# Patient Record
Sex: FEMALE | Race: WHITE | NOT HISPANIC OR LATINO | Employment: OTHER | URBAN - METROPOLITAN AREA
[De-identification: names, ages, dates, MRNs, and addresses within clinical notes are randomized per-mention and may not be internally consistent; named-entity substitution may affect disease eponyms.]

---

## 2021-11-02 ENCOUNTER — APPOINTMENT (OUTPATIENT)
Dept: RADIOLOGY | Facility: CLINIC | Age: 66
End: 2021-11-02
Payer: COMMERCIAL

## 2021-11-02 ENCOUNTER — TELEPHONE (OUTPATIENT)
Dept: OBGYN CLINIC | Facility: CLINIC | Age: 66
End: 2021-11-02

## 2021-11-02 ENCOUNTER — OFFICE VISIT (OUTPATIENT)
Dept: OBGYN CLINIC | Facility: CLINIC | Age: 66
End: 2021-11-02
Payer: COMMERCIAL

## 2021-11-02 VITALS
SYSTOLIC BLOOD PRESSURE: 124 MMHG | DIASTOLIC BLOOD PRESSURE: 72 MMHG | WEIGHT: 156 LBS | BODY MASS INDEX: 25.99 KG/M2 | HEART RATE: 76 BPM | HEIGHT: 65 IN

## 2021-11-02 DIAGNOSIS — M17.11 PRIMARY OSTEOARTHRITIS OF RIGHT KNEE: ICD-10-CM

## 2021-11-02 DIAGNOSIS — M25.561 RIGHT KNEE PAIN, UNSPECIFIED CHRONICITY: ICD-10-CM

## 2021-11-02 DIAGNOSIS — Z01.89 ENCOUNTER FOR LOWER EXTREMITY COMPARISON IMAGING STUDY: Primary | ICD-10-CM

## 2021-11-02 PROCEDURE — 73560 X-RAY EXAM OF KNEE 1 OR 2: CPT

## 2021-11-02 PROCEDURE — 20610 DRAIN/INJ JOINT/BURSA W/O US: CPT | Performed by: ORTHOPAEDIC SURGERY

## 2021-11-02 PROCEDURE — 99204 OFFICE O/P NEW MOD 45 MIN: CPT | Performed by: ORTHOPAEDIC SURGERY

## 2021-11-02 PROCEDURE — 73562 X-RAY EXAM OF KNEE 3: CPT

## 2021-11-02 RX ORDER — DEXAMETHASONE SODIUM PHOSPHATE 100 MG/10ML
40 INJECTION INTRAMUSCULAR; INTRAVENOUS
Status: COMPLETED | OUTPATIENT
Start: 2021-11-02 | End: 2021-11-02

## 2021-11-02 RX ORDER — LIDOCAINE HYDROCHLORIDE 10 MG/ML
4 INJECTION, SOLUTION INFILTRATION; PERINEURAL
Status: COMPLETED | OUTPATIENT
Start: 2021-11-02 | End: 2021-11-02

## 2021-11-02 RX ADMIN — DEXAMETHASONE SODIUM PHOSPHATE 40 MG: 100 INJECTION INTRAMUSCULAR; INTRAVENOUS at 09:11

## 2021-11-02 RX ADMIN — LIDOCAINE HYDROCHLORIDE 4 ML: 10 INJECTION, SOLUTION INFILTRATION; PERINEURAL at 09:11

## 2021-11-03 ENCOUNTER — EVALUATION (OUTPATIENT)
Dept: PHYSICAL THERAPY | Facility: CLINIC | Age: 66
End: 2021-11-03
Payer: COMMERCIAL

## 2021-11-03 DIAGNOSIS — M25.561 RIGHT KNEE PAIN, UNSPECIFIED CHRONICITY: ICD-10-CM

## 2021-11-03 DIAGNOSIS — M17.11 PRIMARY OSTEOARTHRITIS OF RIGHT KNEE: Primary | ICD-10-CM

## 2021-11-03 PROCEDURE — 97162 PT EVAL MOD COMPLEX 30 MIN: CPT

## 2021-11-10 ENCOUNTER — OFFICE VISIT (OUTPATIENT)
Dept: PHYSICAL THERAPY | Facility: CLINIC | Age: 66
End: 2021-11-10
Payer: COMMERCIAL

## 2021-11-10 DIAGNOSIS — M17.11 PRIMARY OSTEOARTHRITIS OF RIGHT KNEE: Primary | ICD-10-CM

## 2021-11-10 DIAGNOSIS — M25.561 RIGHT KNEE PAIN, UNSPECIFIED CHRONICITY: ICD-10-CM

## 2021-11-10 PROCEDURE — 97110 THERAPEUTIC EXERCISES: CPT

## 2021-11-10 PROCEDURE — 97140 MANUAL THERAPY 1/> REGIONS: CPT

## 2021-11-12 ENCOUNTER — OFFICE VISIT (OUTPATIENT)
Dept: PHYSICAL THERAPY | Facility: CLINIC | Age: 66
End: 2021-11-12
Payer: COMMERCIAL

## 2021-11-12 DIAGNOSIS — M25.561 RIGHT KNEE PAIN, UNSPECIFIED CHRONICITY: ICD-10-CM

## 2021-11-12 DIAGNOSIS — M17.11 PRIMARY OSTEOARTHRITIS OF RIGHT KNEE: Primary | ICD-10-CM

## 2021-11-12 PROCEDURE — 97112 NEUROMUSCULAR REEDUCATION: CPT

## 2021-11-12 PROCEDURE — 97110 THERAPEUTIC EXERCISES: CPT

## 2021-11-17 ENCOUNTER — OFFICE VISIT (OUTPATIENT)
Dept: PHYSICAL THERAPY | Facility: CLINIC | Age: 66
End: 2021-11-17
Payer: COMMERCIAL

## 2021-11-17 DIAGNOSIS — M17.11 PRIMARY OSTEOARTHRITIS OF RIGHT KNEE: Primary | ICD-10-CM

## 2021-11-17 DIAGNOSIS — M25.561 RIGHT KNEE PAIN, UNSPECIFIED CHRONICITY: ICD-10-CM

## 2021-11-17 PROCEDURE — 97112 NEUROMUSCULAR REEDUCATION: CPT

## 2021-11-17 PROCEDURE — 97110 THERAPEUTIC EXERCISES: CPT

## 2021-11-19 ENCOUNTER — OFFICE VISIT (OUTPATIENT)
Dept: PHYSICAL THERAPY | Facility: CLINIC | Age: 66
End: 2021-11-19
Payer: COMMERCIAL

## 2021-11-19 DIAGNOSIS — M25.561 RIGHT KNEE PAIN, UNSPECIFIED CHRONICITY: ICD-10-CM

## 2021-11-19 DIAGNOSIS — M17.11 PRIMARY OSTEOARTHRITIS OF RIGHT KNEE: Primary | ICD-10-CM

## 2021-11-19 PROCEDURE — 97112 NEUROMUSCULAR REEDUCATION: CPT

## 2021-11-19 PROCEDURE — 97110 THERAPEUTIC EXERCISES: CPT

## 2021-11-22 ENCOUNTER — OFFICE VISIT (OUTPATIENT)
Dept: PHYSICAL THERAPY | Facility: CLINIC | Age: 66
End: 2021-11-22
Payer: COMMERCIAL

## 2021-11-22 DIAGNOSIS — M17.11 PRIMARY OSTEOARTHRITIS OF RIGHT KNEE: Primary | ICD-10-CM

## 2021-11-22 DIAGNOSIS — M25.561 RIGHT KNEE PAIN, UNSPECIFIED CHRONICITY: ICD-10-CM

## 2021-11-22 PROCEDURE — 97110 THERAPEUTIC EXERCISES: CPT

## 2021-11-22 PROCEDURE — 97112 NEUROMUSCULAR REEDUCATION: CPT

## 2021-11-24 ENCOUNTER — OFFICE VISIT (OUTPATIENT)
Dept: PHYSICAL THERAPY | Facility: CLINIC | Age: 66
End: 2021-11-24
Payer: COMMERCIAL

## 2021-11-24 DIAGNOSIS — M25.561 RIGHT KNEE PAIN, UNSPECIFIED CHRONICITY: ICD-10-CM

## 2021-11-24 DIAGNOSIS — M17.11 PRIMARY OSTEOARTHRITIS OF RIGHT KNEE: Primary | ICD-10-CM

## 2021-11-24 PROCEDURE — 97112 NEUROMUSCULAR REEDUCATION: CPT

## 2021-11-24 PROCEDURE — 97140 MANUAL THERAPY 1/> REGIONS: CPT

## 2021-11-29 ENCOUNTER — OFFICE VISIT (OUTPATIENT)
Dept: PHYSICAL THERAPY | Facility: CLINIC | Age: 66
End: 2021-11-29
Payer: COMMERCIAL

## 2021-11-29 DIAGNOSIS — M17.11 PRIMARY OSTEOARTHRITIS OF RIGHT KNEE: Primary | ICD-10-CM

## 2021-11-29 DIAGNOSIS — M25.561 RIGHT KNEE PAIN, UNSPECIFIED CHRONICITY: ICD-10-CM

## 2021-11-29 PROCEDURE — 97112 NEUROMUSCULAR REEDUCATION: CPT

## 2021-11-29 PROCEDURE — 97110 THERAPEUTIC EXERCISES: CPT

## 2021-12-01 ENCOUNTER — OFFICE VISIT (OUTPATIENT)
Dept: PHYSICAL THERAPY | Facility: CLINIC | Age: 66
End: 2021-12-01
Payer: COMMERCIAL

## 2021-12-01 DIAGNOSIS — M25.561 RIGHT KNEE PAIN, UNSPECIFIED CHRONICITY: ICD-10-CM

## 2021-12-01 DIAGNOSIS — M17.11 PRIMARY OSTEOARTHRITIS OF RIGHT KNEE: Primary | ICD-10-CM

## 2021-12-01 PROCEDURE — 97110 THERAPEUTIC EXERCISES: CPT

## 2021-12-01 PROCEDURE — 97140 MANUAL THERAPY 1/> REGIONS: CPT

## 2021-12-06 ENCOUNTER — EVALUATION (OUTPATIENT)
Dept: PHYSICAL THERAPY | Facility: CLINIC | Age: 66
End: 2021-12-06
Payer: COMMERCIAL

## 2021-12-06 DIAGNOSIS — M25.561 RIGHT KNEE PAIN, UNSPECIFIED CHRONICITY: ICD-10-CM

## 2021-12-06 DIAGNOSIS — M17.11 PRIMARY OSTEOARTHRITIS OF RIGHT KNEE: Primary | ICD-10-CM

## 2021-12-06 PROCEDURE — 97110 THERAPEUTIC EXERCISES: CPT

## 2021-12-06 PROCEDURE — 97140 MANUAL THERAPY 1/> REGIONS: CPT

## 2021-12-08 ENCOUNTER — OFFICE VISIT (OUTPATIENT)
Dept: PHYSICAL THERAPY | Facility: CLINIC | Age: 66
End: 2021-12-08
Payer: COMMERCIAL

## 2021-12-08 DIAGNOSIS — M17.11 PRIMARY OSTEOARTHRITIS OF RIGHT KNEE: Primary | ICD-10-CM

## 2021-12-08 DIAGNOSIS — M25.561 RIGHT KNEE PAIN, UNSPECIFIED CHRONICITY: ICD-10-CM

## 2021-12-08 PROCEDURE — 97112 NEUROMUSCULAR REEDUCATION: CPT

## 2021-12-08 PROCEDURE — 97110 THERAPEUTIC EXERCISES: CPT

## 2021-12-13 ENCOUNTER — APPOINTMENT (OUTPATIENT)
Dept: PHYSICAL THERAPY | Facility: CLINIC | Age: 66
End: 2021-12-13
Payer: COMMERCIAL

## 2021-12-13 ENCOUNTER — OFFICE VISIT (OUTPATIENT)
Dept: OBGYN CLINIC | Facility: CLINIC | Age: 66
End: 2021-12-13
Payer: COMMERCIAL

## 2021-12-13 VITALS
HEART RATE: 82 BPM | SYSTOLIC BLOOD PRESSURE: 144 MMHG | BODY MASS INDEX: 25.99 KG/M2 | DIASTOLIC BLOOD PRESSURE: 81 MMHG | WEIGHT: 156 LBS | HEIGHT: 65 IN

## 2021-12-13 DIAGNOSIS — M17.11 PRIMARY OSTEOARTHRITIS OF RIGHT KNEE: Primary | ICD-10-CM

## 2021-12-13 PROCEDURE — 99214 OFFICE O/P EST MOD 30 MIN: CPT | Performed by: PHYSICIAN ASSISTANT

## 2021-12-14 ENCOUNTER — OFFICE VISIT (OUTPATIENT)
Dept: PHYSICAL THERAPY | Facility: CLINIC | Age: 66
End: 2021-12-14
Payer: COMMERCIAL

## 2021-12-14 DIAGNOSIS — M17.11 PRIMARY OSTEOARTHRITIS OF RIGHT KNEE: Primary | ICD-10-CM

## 2021-12-14 DIAGNOSIS — M25.561 RIGHT KNEE PAIN, UNSPECIFIED CHRONICITY: ICD-10-CM

## 2021-12-14 PROCEDURE — 97110 THERAPEUTIC EXERCISES: CPT

## 2021-12-15 ENCOUNTER — APPOINTMENT (OUTPATIENT)
Dept: PHYSICAL THERAPY | Facility: CLINIC | Age: 66
End: 2021-12-15
Payer: COMMERCIAL

## 2021-12-17 ENCOUNTER — OFFICE VISIT (OUTPATIENT)
Dept: PHYSICAL THERAPY | Facility: CLINIC | Age: 66
End: 2021-12-17
Payer: COMMERCIAL

## 2021-12-17 DIAGNOSIS — M17.11 PRIMARY OSTEOARTHRITIS OF RIGHT KNEE: Primary | ICD-10-CM

## 2021-12-17 DIAGNOSIS — M25.561 RIGHT KNEE PAIN, UNSPECIFIED CHRONICITY: ICD-10-CM

## 2021-12-17 PROCEDURE — 97530 THERAPEUTIC ACTIVITIES: CPT

## 2021-12-17 PROCEDURE — 97110 THERAPEUTIC EXERCISES: CPT

## 2021-12-20 ENCOUNTER — APPOINTMENT (OUTPATIENT)
Dept: PHYSICAL THERAPY | Facility: CLINIC | Age: 66
End: 2021-12-20
Payer: COMMERCIAL

## 2021-12-23 ENCOUNTER — APPOINTMENT (OUTPATIENT)
Dept: PHYSICAL THERAPY | Facility: CLINIC | Age: 66
End: 2021-12-23
Payer: COMMERCIAL

## 2021-12-27 ENCOUNTER — OFFICE VISIT (OUTPATIENT)
Dept: PHYSICAL THERAPY | Facility: CLINIC | Age: 66
End: 2021-12-27
Payer: COMMERCIAL

## 2021-12-27 DIAGNOSIS — M25.561 RIGHT KNEE PAIN, UNSPECIFIED CHRONICITY: ICD-10-CM

## 2021-12-27 DIAGNOSIS — M17.11 PRIMARY OSTEOARTHRITIS OF RIGHT KNEE: Primary | ICD-10-CM

## 2021-12-27 PROCEDURE — 97112 NEUROMUSCULAR REEDUCATION: CPT

## 2021-12-27 PROCEDURE — 97110 THERAPEUTIC EXERCISES: CPT

## 2022-03-15 ENCOUNTER — OFFICE VISIT (OUTPATIENT)
Dept: OBGYN CLINIC | Facility: CLINIC | Age: 67
End: 2022-03-15
Payer: MEDICARE

## 2022-03-15 VITALS
SYSTOLIC BLOOD PRESSURE: 136 MMHG | HEIGHT: 65 IN | WEIGHT: 156 LBS | HEART RATE: 81 BPM | DIASTOLIC BLOOD PRESSURE: 81 MMHG | BODY MASS INDEX: 25.99 KG/M2

## 2022-03-15 DIAGNOSIS — M23.91 INTERNAL DERANGEMENT OF RIGHT KNEE: ICD-10-CM

## 2022-03-15 DIAGNOSIS — M17.11 PRIMARY OSTEOARTHRITIS OF RIGHT KNEE: Primary | ICD-10-CM

## 2022-03-15 PROCEDURE — 99214 OFFICE O/P EST MOD 30 MIN: CPT | Performed by: ORTHOPAEDIC SURGERY

## 2022-03-15 NOTE — PROGRESS NOTES
Assessment/Plan:  1  Primary osteoarthritis of right knee       The patient does have some arthritis about the knee, however given her lack of improvement with conservative treatment and her new onset of mechanical symptoms, I do have concern for medial meniscus tear  I am ordering an MRI to rule this out  She will follow-up after the MRI to discuss the results and how to proceed  She may continue ice and OTC medications for discomfort in the meantime  Subjective:   Olivia León is a 74 y o  female who presents today for follow-up of her right knee  We have been treating her for mild to moderate arthritis about the medial and patellofemoral compartments  Prior steroid injection helped for only one day  She has been doing physical therapy, which seems to be helping some, but she still is quite bothered by her knee pain  Her pain is about the medial compartment and is worse with activity and better with rest  She notes swelling about the knee and has started with intermittent locking about the knee  Review of Systems   Constitutional: Negative  Negative for chills and fever  HENT: Negative  Negative for ear pain and sore throat  Eyes: Negative  Negative for pain and redness  Respiratory: Negative  Negative for shortness of breath and wheezing  Cardiovascular: Negative for chest pain and palpitations  Gastrointestinal: Negative  Negative for abdominal pain and blood in stool  Endocrine: Negative  Negative for polydipsia and polyuria  Genitourinary: Negative  Negative for difficulty urinating and dysuria  Musculoskeletal:        As noted in HPI   Skin: Negative  Negative for pallor and rash  Neurological: Negative  Negative for dizziness and numbness  Hematological: Negative  Negative for adenopathy  Does not bruise/bleed easily  Psychiatric/Behavioral: Negative  Negative for confusion and suicidal ideas  History reviewed  No pertinent past medical history      Past Surgical History:   Procedure Laterality Date    KNEE SURGERY Left     Meniscus Repair       Family History   Problem Relation Age of Onset    No Known Problems Mother     No Known Problems Father     No Known Problems Sister     No Known Problems Brother     No Known Problems Maternal Aunt     No Known Problems Maternal Uncle     No Known Problems Paternal Aunt     No Known Problems Paternal Uncle     No Known Problems Maternal Grandmother     No Known Problems Maternal Grandfather     No Known Problems Paternal Grandmother     No Known Problems Paternal Grandfather        Social History     Occupational History    Not on file   Tobacco Use    Smoking status: Former Smoker    Smokeless tobacco: Never Used   Vaping Use    Vaping Use: Never used   Substance and Sexual Activity    Alcohol use: Yes     Comment: Rare    Drug use: Never    Sexual activity: Not on file       No current outpatient medications on file  No Known Allergies    Objective:  Vitals:    03/15/22 0834   BP: 136/81   Pulse: 81       Right Knee Exam     Tenderness   The patient is experiencing tenderness in the medial joint line  Range of Motion   Extension: 0   Flexion: 110     Tests   Varus: negative Valgus: negative  Lachman:  Anterior - negative      Drawer:  Anterior - negative    Posterior - negative    Other   Erythema: absent  Sensation: normal  Pulse: present  Effusion: effusion (trace) present          Observations     Right Knee   Positive for effusion (trace)  Physical Exam  Constitutional:       General: She is not in acute distress  Appearance: She is well-developed  HENT:      Head: Normocephalic and atraumatic  Eyes:      General: No scleral icterus  Conjunctiva/sclera: Conjunctivae normal    Neck:      Vascular: No JVD  Cardiovascular:      Rate and Rhythm: Normal rate  Pulmonary:      Effort: Pulmonary effort is normal  No respiratory distress     Musculoskeletal:      Right knee: Effusion (trace) present  Skin:     General: Skin is warm  Neurological:      Mental Status: She is alert and oriented to person, place, and time        Coordination: Coordination normal

## 2022-03-21 ENCOUNTER — TELEPHONE (OUTPATIENT)
Dept: OBGYN CLINIC | Facility: CLINIC | Age: 67
End: 2022-03-21

## 2022-03-21 NOTE — TELEPHONE ENCOUNTER
I attempted to [facility] on machine to reschedule her follow up to go over MRI  Her MRI is at [facility] today at 3 something  Report will not be ready for tomorrows appointment  Her voicemail is full and I was unable to get a hold of the patient

## 2022-03-21 NOTE — TELEPHONE ENCOUNTER
Patient called back, aware we need time to get the written report  She has the MRI on disk-I rescheduled her to next Tuesday for the MRI review

## 2022-03-29 ENCOUNTER — OFFICE VISIT (OUTPATIENT)
Dept: OBGYN CLINIC | Facility: CLINIC | Age: 67
End: 2022-03-29
Payer: MEDICARE

## 2022-03-29 VITALS — BODY MASS INDEX: 25.99 KG/M2 | HEIGHT: 65 IN | WEIGHT: 156 LBS

## 2022-03-29 DIAGNOSIS — S83.241A OTHER TEAR OF MEDIAL MENISCUS, CURRENT INJURY, RIGHT KNEE, INITIAL ENCOUNTER: Primary | ICD-10-CM

## 2022-03-29 PROCEDURE — 99214 OFFICE O/P EST MOD 30 MIN: CPT | Performed by: ORTHOPAEDIC SURGERY

## 2022-03-29 NOTE — PROGRESS NOTES
Assessment/Plan:  1  Other tear of medial meniscus, current injury, right knee, initial encounter       The patient does have a medial meniscus, but also has arthritis of the medial compartment  We discussed possible arthroscopic meniscectomy, however the patient would like to think about this  We dicussed that it is difficulty to tell if her meniscal tear or her arthritis is causing her more discomfort  We also discussed a trial of joint lubricant injections, if she chooses to forego surgical intervention  She will call us if she would like to proceed with right knee arthroscopy with medial meniscectomy  We discussed the procedure and risks at length, including but not limited to, infection, bleeding, wound issues, nerve injury, blood clot, stiffness, failure of procedure, and need for additional surgery  Subjective:   Negin Munoz is a 74 y o  female who presents today for follow-up of her right knee  She did have an MRI which showed a complex tear of the medial meniscus as well as medial compartment arthritis  She notes ongoing pain about the medial knee, which is worse with activity and better with rest  She gets intermittent catching/clicking about the knee as well  She notes good ROM and good sensation of the right lower extremity  She has already done extensive physical therapy for her knee  Review of Systems   Constitutional: Negative  Negative for chills and fever  HENT: Negative  Negative for ear pain and sore throat  Eyes: Negative  Negative for pain and redness  Respiratory: Negative  Negative for shortness of breath and wheezing  Cardiovascular: Negative for chest pain and palpitations  Gastrointestinal: Negative  Negative for abdominal pain and blood in stool  Endocrine: Negative  Negative for polydipsia and polyuria  Genitourinary: Negative  Negative for difficulty urinating and dysuria  Musculoskeletal:        As noted in HPI   Skin: Negative    Negative for pallor and rash  Neurological: Negative  Negative for dizziness and numbness  Hematological: Negative  Negative for adenopathy  Does not bruise/bleed easily  Psychiatric/Behavioral: Negative  Negative for confusion and suicidal ideas  History reviewed  No pertinent past medical history  Past Surgical History:   Procedure Laterality Date    KNEE SURGERY Left     Meniscus Repair       Family History   Problem Relation Age of Onset    No Known Problems Mother     No Known Problems Father     No Known Problems Sister     No Known Problems Brother     No Known Problems Maternal Aunt     No Known Problems Maternal Uncle     No Known Problems Paternal Aunt     No Known Problems Paternal Uncle     No Known Problems Maternal Grandmother     No Known Problems Maternal Grandfather     No Known Problems Paternal Grandmother     No Known Problems Paternal Grandfather        Social History     Occupational History    Not on file   Tobacco Use    Smoking status: Former Smoker    Smokeless tobacco: Never Used   Vaping Use    Vaping Use: Never used   Substance and Sexual Activity    Alcohol use: Yes     Comment: Rare    Drug use: Never    Sexual activity: Not on file       No current outpatient medications on file  No Known Allergies    Objective: There were no vitals filed for this visit  Right Knee Exam     Tenderness   The patient is experiencing tenderness in the medial joint line  Range of Motion   Extension: 0   Flexion: 120     Tests   Varus: negative Valgus: negative  Drawer:  Anterior - negative    Posterior - negative    Other   Erythema: absent  Sensation: normal  Pulse: present  Swelling: none            Physical Exam  Constitutional:       General: She is not in acute distress  Appearance: She is well-developed  HENT:      Head: Normocephalic and atraumatic  Eyes:      General: No scleral icterus       Conjunctiva/sclera: Conjunctivae normal    Neck: Vascular: No JVD  Cardiovascular:      Rate and Rhythm: Normal rate  Pulmonary:      Effort: Pulmonary effort is normal  No respiratory distress  Skin:     General: Skin is warm  Neurological:      Mental Status: She is alert and oriented to person, place, and time  Coordination: Coordination normal          I have personally reviewed pertinent films in PACS and my interpretation is as follows:  MRI right knee: Complex tear medial meniscus tear  Arthritis medial compartment

## 2022-03-30 ENCOUNTER — TELEPHONE (OUTPATIENT)
Dept: OBGYN CLINIC | Facility: CLINIC | Age: 67
End: 2022-03-30

## 2022-03-30 NOTE — TELEPHONE ENCOUNTER
Feli Faye called in to schedule surgery for her right knee      Please advise if she will require pre op labs, ekg, or clearance(s)

## 2022-04-04 ENCOUNTER — TELEPHONE (OUTPATIENT)
Dept: OBGYN CLINIC | Facility: CLINIC | Age: 67
End: 2022-04-04

## 2022-04-04 NOTE — TELEPHONE ENCOUNTER
Attempted to reach [facility] regarding her pcp clearance appointment scheduled with Dr Zakia Gillis  This appt is scheduled for April 21 at 12 pm   She had a previous appt scheduled for April 12, however, that was cancelled as there wasn't sufficient time for the clearance to be done at that visit  Teagan's mail box is full, and I'm not able to leave a message  I will try again at a later time

## 2022-04-06 NOTE — TELEPHONE ENCOUNTER
I tried again, and was successful with reaching Teagan  We have all dates confirmed for her [facility] appointment and when and where she should go for her pre op testing

## 2022-04-06 NOTE — TELEPHONE ENCOUNTER
Attempted to reach Teagan again, however no answer and her mail box is full  I could not leave a voice message for her

## 2022-04-06 NOTE — TELEPHONE ENCOUNTER
I did attempt to reach [facility] through her emergency contact, Yodit Rivas (spouse) at [phone]  A detailed message was NOT left  I did ask for him to get a message to [facility] to have her call Dr Vannessa Santacruz office regarding upcoming appointments that have been scheduled for her

## 2022-04-12 ENCOUNTER — APPOINTMENT (OUTPATIENT)
Dept: LAB | Facility: HOSPITAL | Age: 67
End: 2022-04-12
Attending: ORTHOPAEDIC SURGERY
Payer: MEDICARE

## 2022-04-12 DIAGNOSIS — Z11.59 SPECIAL SCREENING EXAMINATION FOR VIRAL DISEASE: ICD-10-CM

## 2022-04-12 DIAGNOSIS — S83.242A TEAR OF MEDIAL MENISCUS OF LEFT KNEE, CURRENT, UNSPECIFIED TEAR TYPE, INITIAL ENCOUNTER: ICD-10-CM

## 2022-04-12 DIAGNOSIS — Z01.812 ENCOUNTER FOR PRE-OPERATIVE LABORATORY TESTING: ICD-10-CM

## 2022-04-12 LAB
ANION GAP SERPL CALCULATED.3IONS-SCNC: 10 MMOL/L (ref 4–13)
APTT PPP: 29 SECONDS (ref 23–37)
ATRIAL RATE: 69 BPM
BASOPHILS # BLD AUTO: 0.05 THOUSANDS/ΜL (ref 0–0.1)
BASOPHILS NFR BLD AUTO: 1 % (ref 0–1)
BUN SERPL-MCNC: 20 MG/DL (ref 5–25)
CALCIUM SERPL-MCNC: 8.6 MG/DL (ref 8.3–10.1)
CHLORIDE SERPL-SCNC: 104 MMOL/L (ref 100–108)
CO2 SERPL-SCNC: 27 MMOL/L (ref 21–32)
CREAT SERPL-MCNC: 0.9 MG/DL (ref 0.6–1.3)
EOSINOPHIL # BLD AUTO: 0.09 THOUSAND/ΜL (ref 0–0.61)
EOSINOPHIL NFR BLD AUTO: 2 % (ref 0–6)
ERYTHROCYTE [DISTWIDTH] IN BLOOD BY AUTOMATED COUNT: 14 % (ref 11.6–15.1)
GFR SERPL CREATININE-BSD FRML MDRD: 67 ML/MIN/1.73SQ M
GLUCOSE SERPL-MCNC: 81 MG/DL (ref 65–140)
HCT VFR BLD AUTO: 42.7 % (ref 34.8–46.1)
HGB BLD-MCNC: 13.2 G/DL (ref 11.5–15.4)
IMM GRANULOCYTES # BLD AUTO: 0.01 THOUSAND/UL (ref 0–0.2)
IMM GRANULOCYTES NFR BLD AUTO: 0 % (ref 0–2)
INR PPP: 0.93 (ref 0.84–1.19)
LYMPHOCYTES # BLD AUTO: 1.81 THOUSANDS/ΜL (ref 0.6–4.47)
LYMPHOCYTES NFR BLD AUTO: 33 % (ref 14–44)
MCH RBC QN AUTO: 27.2 PG (ref 26.8–34.3)
MCHC RBC AUTO-ENTMCNC: 30.9 G/DL (ref 31.4–37.4)
MCV RBC AUTO: 88 FL (ref 82–98)
MONOCYTES # BLD AUTO: 0.36 THOUSAND/ΜL (ref 0.17–1.22)
MONOCYTES NFR BLD AUTO: 7 % (ref 4–12)
NEUTROPHILS # BLD AUTO: 3.18 THOUSANDS/ΜL (ref 1.85–7.62)
NEUTS SEG NFR BLD AUTO: 57 % (ref 43–75)
NRBC BLD AUTO-RTO: 0 /100 WBCS
P AXIS: 77 DEGREES
PLATELET # BLD AUTO: 217 THOUSANDS/UL (ref 149–390)
PMV BLD AUTO: 9.8 FL (ref 8.9–12.7)
POTASSIUM SERPL-SCNC: 3.7 MMOL/L (ref 3.5–5.3)
PR INTERVAL: 160 MS
PROTHROMBIN TIME: 12.3 SECONDS (ref 11.6–14.5)
QRS AXIS: 56 DEGREES
QRSD INTERVAL: 76 MS
QT INTERVAL: 420 MS
QTC INTERVAL: 450 MS
RBC # BLD AUTO: 4.85 MILLION/UL (ref 3.81–5.12)
SODIUM SERPL-SCNC: 141 MMOL/L (ref 136–145)
T WAVE AXIS: 53 DEGREES
VENTRICULAR RATE: 69 BPM
WBC # BLD AUTO: 5.5 THOUSAND/UL (ref 4.31–10.16)

## 2022-04-12 PROCEDURE — 93010 ELECTROCARDIOGRAM REPORT: CPT | Performed by: INTERNAL MEDICINE

## 2022-04-12 PROCEDURE — 36415 COLL VENOUS BLD VENIPUNCTURE: CPT

## 2022-04-12 PROCEDURE — 85025 COMPLETE CBC W/AUTO DIFF WBC: CPT

## 2022-04-12 PROCEDURE — 80048 BASIC METABOLIC PNL TOTAL CA: CPT

## 2022-04-12 PROCEDURE — 85730 THROMBOPLASTIN TIME PARTIAL: CPT

## 2022-04-12 PROCEDURE — 93005 ELECTROCARDIOGRAM TRACING: CPT

## 2022-04-12 PROCEDURE — 85610 PROTHROMBIN TIME: CPT

## 2022-04-26 PROCEDURE — U0003 INFECTIOUS AGENT DETECTION BY NUCLEIC ACID (DNA OR RNA); SEVERE ACUTE RESPIRATORY SYNDROME CORONAVIRUS 2 (SARS-COV-2) (CORONAVIRUS DISEASE [COVID-19]), AMPLIFIED PROBE TECHNIQUE, MAKING USE OF HIGH THROUGHPUT TECHNOLOGIES AS DESCRIBED BY CMS-2020-01-R: HCPCS | Performed by: ORTHOPAEDIC SURGERY

## 2022-04-26 PROCEDURE — U0005 INFEC AGEN DETEC AMPLI PROBE: HCPCS | Performed by: ORTHOPAEDIC SURGERY

## 2022-04-26 RX ORDER — MELATONIN
1000 DAILY
COMMUNITY

## 2022-04-26 RX ORDER — MULTIVITAMIN
1 TABLET ORAL DAILY
COMMUNITY

## 2022-04-26 RX ORDER — ZINC GLUCONATE 50 MG
50 TABLET ORAL DAILY
COMMUNITY

## 2022-04-26 RX ORDER — OMEGA-3-ACID ETHYL ESTERS 1 G/1
2 CAPSULE, LIQUID FILLED ORAL 2 TIMES DAILY
COMMUNITY

## 2022-04-26 RX ORDER — RIBOFLAVIN (VITAMIN B2) 100 MG
100 TABLET ORAL DAILY
COMMUNITY

## 2022-04-26 NOTE — PRE-PROCEDURE INSTRUCTIONS
My Surgical Experience    The following information was developed to assist you to prepare for your operation  What do I need to do before coming to the hospital?   Arrange for a responsible person to drive you to and from the hospital    Arrange care for your children at home  Children are not allowed in the recovery areas of the hospital   Plan to wear clothing that is easy to put on and take off  If you are having shoulder surgery, wear a shirt that buttons or zippers in the front  Bathing  o Shower the evening before and the morning of your surgery with an antibacterial soap  Please refer to the Pre Op Showering Instructions for Surgery Patients Sheet   o Remove nail polish and all body piercing jewelry  o Do not shave any body part for at least 24 hours before surgery-this includes face, arms, legs and upper body  Food  o Nothing to eat or drink after midnight the night before your surgery  This includes candy and chewing gum  o Exception: If your surgery is after 12:00pm (noon), you may have clear liquids such as 7-Up®, ginger ale, apple or cranberry juice, Jell-O®, water, or clear broth until 8:00 am  o Do not drink milk or juice with pulp on the morning before surgery  o Do not drink alcohol 24 hours before surgery  Medicine  o Follow instructions you received from your surgeon about which medicines you may take on the day of surgery  o If instructed to take medicine on the morning of surgery, take pills with just a small sip of water  Call your prescribing doctor for specific infroamtion on what to do if you take insulin    What should I bring to the hospital?    Bring:  Tra Rivera or a walker, if you have them, for foot or knee surgery   A list of the daily medicines, vitamins, minerals, herbals and nutritional supplements you take   Include the dosages of medicines and the time you take them each day   Glasses, dentures or hearing aids   Minimal clothing; you will be wearing hospital sleepwear   Photo ID; required to verify your identity   If you have a Living Will or Power of , bring a copy of the documents   If you have an ostomy, bring an extra pouch and any supplies you use    Do not bring   Medicines or inhalers   Money, valuables or jewelry    What other information should I know about the day of surgery?  Notify your surgeons if you develop a cold, sore throat, cough, fever, rash or any other illness   Report to the Ambulatory Surgical/Same Day Surgery Unit   You will be instructed to stop at Registration only if you have not been pre-registered   Inform your  fi they do not stay that they will be asked by the staff to leave a phone number where they can be reached   Be available to be reached before surgery  In the event the operating room schedule changes, you may be asked to come in earlier or later than expected    *It is important to tell your doctor and others involved in your health care if you are taking or have been taking any non-prescription drugs, vitamins, minerals, herbals or other nutritional supplements  Any of these may interact with some food or medicines and cause a reaction      Pre-Surgery Instructions:   Medication Instructions    Ascorbic Acid (vitamin C) 100 MG tablet Hold day of surgery   cholecalciferol (VITAMIN D3) 1,000 units tablet Hold day of surgery   Multiple Vitamin (multivitamin) tablet Hold day of surgery   NON FORMULARY Stop taking 7 days prior to surgery   omega-3-acid ethyl esters (LOVAZA) 1 g capsule Stop taking 7 days prior to surgery   zinc gluconate 50 mg tablet Hold day of surgery

## 2022-04-29 ENCOUNTER — ANESTHESIA EVENT (OUTPATIENT)
Dept: PERIOP | Facility: HOSPITAL | Age: 67
End: 2022-04-29
Payer: MEDICARE

## 2022-04-29 NOTE — ANESTHESIA PREPROCEDURE EVALUATION
Procedure:  ARTHROSCOPY KNEE MEDIAL MENISCECTOMY (Right Knee)    Relevant Problems   CARDIO   (+) Hyperlipidemia (no meds)        Physical Exam    Airway    Mallampati score: II  TM Distance: >3 FB  Neck ROM: full     Dental       Cardiovascular  Rhythm: regular, Rate: normal,     Pulmonary  Breath sounds clear to auscultation,     Other Findings        Anesthesia Plan  ASA Score- 2     Anesthesia Type- general with ASA Monitors  Additional Monitors:   Airway Plan: LMA  Plan Factors-    Chart reviewed  Patient is not a current smoker  Induction- intravenous  Postoperative Plan- Plan for postoperative opioid use  Informed Consent- Anesthetic plan and risks discussed with patient  I personally reviewed this patient with the CRNA  Discussed and agreed on the Anesthesia Plan with the CRNA  Christian Nick

## 2022-05-02 ENCOUNTER — HOSPITAL ENCOUNTER (OUTPATIENT)
Facility: HOSPITAL | Age: 67
Setting detail: OUTPATIENT SURGERY
Discharge: HOME | End: 2022-05-02
Attending: ORTHOPAEDIC SURGERY | Admitting: ORTHOPAEDIC SURGERY
Payer: MEDICARE

## 2022-05-02 ENCOUNTER — ANESTHESIA (OUTPATIENT)
Dept: PERIOP | Facility: HOSPITAL | Age: 67
End: 2022-05-02
Payer: MEDICARE

## 2022-05-02 VITALS
SYSTOLIC BLOOD PRESSURE: 114 MMHG | OXYGEN SATURATION: 98 % | TEMPERATURE: 96.9 F | BODY MASS INDEX: 26.36 KG/M2 | WEIGHT: 158.2 LBS | HEIGHT: 65 IN | DIASTOLIC BLOOD PRESSURE: 60 MMHG | RESPIRATION RATE: 16 BRPM | HEART RATE: 73 BPM

## 2022-05-02 PROBLEM — E78.5 HYPERLIPIDEMIA: Status: ACTIVE | Noted: 2022-05-02

## 2022-05-02 PROCEDURE — 99024 POSTOP FOLLOW-UP VISIT: CPT | Performed by: PHYSICIAN ASSISTANT

## 2022-05-02 PROCEDURE — 29881 ARTHRS KNE SRG MNISECTMY M/L: CPT | Performed by: ORTHOPAEDIC SURGERY

## 2022-05-02 PROCEDURE — 29881 ARTHRS KNE SRG MNISECTMY M/L: CPT | Performed by: PHYSICIAN ASSISTANT

## 2022-05-02 RX ORDER — ONDANSETRON 2 MG/ML
4 INJECTION INTRAMUSCULAR; INTRAVENOUS ONCE AS NEEDED
Status: DISCONTINUED | OUTPATIENT
Start: 2022-05-02 | End: 2022-05-02 | Stop reason: HOSPADM

## 2022-05-02 RX ORDER — DEXAMETHASONE SODIUM PHOSPHATE 4 MG/ML
INJECTION, SOLUTION INTRA-ARTICULAR; INTRALESIONAL; INTRAMUSCULAR; INTRAVENOUS; SOFT TISSUE AS NEEDED
Status: DISCONTINUED | OUTPATIENT
Start: 2022-05-02 | End: 2022-05-02

## 2022-05-02 RX ORDER — FENTANYL CITRATE/PF 50 MCG/ML
25 SYRINGE (ML) INJECTION
Status: DISCONTINUED | OUTPATIENT
Start: 2022-05-02 | End: 2022-05-02 | Stop reason: HOSPADM

## 2022-05-02 RX ORDER — SODIUM CHLORIDE, SODIUM LACTATE, POTASSIUM CHLORIDE, CALCIUM CHLORIDE 600; 310; 30; 20 MG/100ML; MG/100ML; MG/100ML; MG/100ML
75 INJECTION, SOLUTION INTRAVENOUS CONTINUOUS
Status: DISCONTINUED | OUTPATIENT
Start: 2022-05-02 | End: 2022-05-02 | Stop reason: HOSPADM

## 2022-05-02 RX ORDER — ONDANSETRON 2 MG/ML
INJECTION INTRAMUSCULAR; INTRAVENOUS AS NEEDED
Status: DISCONTINUED | OUTPATIENT
Start: 2022-05-02 | End: 2022-05-02

## 2022-05-02 RX ORDER — OXYCODONE HYDROCHLORIDE AND ACETAMINOPHEN 5; 325 MG/1; MG/1
1 TABLET ORAL ONCE AS NEEDED
Status: COMPLETED | OUTPATIENT
Start: 2022-05-02 | End: 2022-05-02

## 2022-05-02 RX ORDER — BUPIVACAINE HYDROCHLORIDE AND EPINEPHRINE 2.5; 5 MG/ML; UG/ML
INJECTION, SOLUTION INFILTRATION; PERINEURAL AS NEEDED
Status: DISCONTINUED | OUTPATIENT
Start: 2022-05-02 | End: 2022-05-02 | Stop reason: HOSPADM

## 2022-05-02 RX ORDER — FENTANYL CITRATE 50 UG/ML
INJECTION, SOLUTION INTRAMUSCULAR; INTRAVENOUS AS NEEDED
Status: DISCONTINUED | OUTPATIENT
Start: 2022-05-02 | End: 2022-05-02

## 2022-05-02 RX ORDER — LIDOCAINE HYDROCHLORIDE 10 MG/ML
INJECTION, SOLUTION EPIDURAL; INFILTRATION; INTRACAUDAL; PERINEURAL AS NEEDED
Status: DISCONTINUED | OUTPATIENT
Start: 2022-05-02 | End: 2022-05-02

## 2022-05-02 RX ORDER — MAGNESIUM HYDROXIDE 1200 MG/15ML
LIQUID ORAL AS NEEDED
Status: DISCONTINUED | OUTPATIENT
Start: 2022-05-02 | End: 2022-05-02 | Stop reason: HOSPADM

## 2022-05-02 RX ORDER — PROPOFOL 10 MG/ML
INJECTION, EMULSION INTRAVENOUS AS NEEDED
Status: DISCONTINUED | OUTPATIENT
Start: 2022-05-02 | End: 2022-05-02

## 2022-05-02 RX ORDER — CEFAZOLIN SODIUM 2 G/50ML
2000 SOLUTION INTRAVENOUS ONCE
Status: COMPLETED | OUTPATIENT
Start: 2022-05-02 | End: 2022-05-02

## 2022-05-02 RX ADMIN — ONDANSETRON 4 MG: 2 INJECTION INTRAMUSCULAR; INTRAVENOUS at 13:47

## 2022-05-02 RX ADMIN — SODIUM CHLORIDE, SODIUM LACTATE, POTASSIUM CHLORIDE, AND CALCIUM CHLORIDE 75 ML/HR: .6; .31; .03; .02 INJECTION, SOLUTION INTRAVENOUS at 12:51

## 2022-05-02 RX ADMIN — PROPOFOL 200 MG: 10 INJECTION, EMULSION INTRAVENOUS at 13:40

## 2022-05-02 RX ADMIN — DEXAMETHASONE SODIUM PHOSPHATE 4 MG: 4 INJECTION, SOLUTION INTRA-ARTICULAR; INTRALESIONAL; INTRAMUSCULAR; INTRAVENOUS; SOFT TISSUE at 13:47

## 2022-05-02 RX ADMIN — CEFAZOLIN SODIUM 2000 MG: 2 SOLUTION INTRAVENOUS at 13:35

## 2022-05-02 RX ADMIN — OXYCODONE HYDROCHLORIDE AND ACETAMINOPHEN 1 TABLET: 5; 325 TABLET ORAL at 14:41

## 2022-05-02 RX ADMIN — LIDOCAINE HYDROCHLORIDE 50 MG: 10 INJECTION, SOLUTION EPIDURAL; INFILTRATION; INTRACAUDAL; PERINEURAL at 13:40

## 2022-05-02 RX ADMIN — FENTANYL CITRATE 50 MCG: 50 INJECTION, SOLUTION INTRAMUSCULAR; INTRAVENOUS at 13:40

## 2022-05-02 RX ADMIN — FENTANYL CITRATE 50 MCG: 50 INJECTION, SOLUTION INTRAMUSCULAR; INTRAVENOUS at 14:08

## 2022-05-02 NOTE — PERIOPERATIVE NURSING NOTE
Received patient from PACU in room 8, patient is alert and awake, VSS, denies pain, no distress noted, dressing clean, dry and intact on RLE, neurovascular assessment is WNL in RLE, Patient is tolerating PO fluids, call bell placed in reach, will continue to monitor patient until d/c criteria met

## 2022-05-02 NOTE — PERIOPERATIVE NURSING NOTE
Pt d/c to home at this time w/spouse,  Via w/c  Pt left with all belongings  Iv was D/C intact with dry sterile dressing, surgical dressing clean dry and intact  Encouraged to keep follow up appointments, Verbalized understanding  D/C instructions reviewed and explained with patient and family member  Verbalized understanding  New Rx explained, Verbalized understanding

## 2022-05-02 NOTE — DISCHARGE INSTRUCTIONS
INSTRUCTIONS FOLLOWING YOUR KNEE ARTHROSCOPY    FIRST FOLLOW-UP VISIT AFTER SURGERY      Call the office the day after your surgery & make an appointment for 1 week to see Dr Jacob Rodriguez  At that appointment, your stitches will be removed  CANE OR CRUTCHES      You may put as much weight on your leg as tolerated when using the crutches/cane  When you feel that the crutches/cane is not necessary, you may discontinue its use  Use common sense, let pain be your guide for your activities  Climbing stairs, walking and sitting are all permitted as you tolerate them  EXERCISE PROGRAM      At your 1st follow-up visit you will be given a prescription for physical therapy if you have not already been given one  SHOWERING      Keep original bandage on for 48 hours after surgery & replace with band-aids  You should keep the band-aids on until you see Dr Jacob Rodriguez  After 48 hours, it is okay to get your incision wet & you may shower  Do not take any baths or soak in a hot tub or pool until your stitches have been removed  INCISION SITE      You may notice a small amount of blood on your bandage, about the size of a quarter, this is normal and there is no need to worry  If you notice uncontrollable or excessive bleeding, oozing, or redness of the wound please call the office  SWELLING AND DISCOMFORT      You will experience some pain and discomfort after surgery  You will be given a prescription for pain medication  Take the medication as prescribed  If the discomfort is mild, you can take 1 or 2 Tylenol, every 4-6 hours as needed, instead of the prescribed pain medication  You will notice some swelling of your knee after surgery, this is normal      To help reduce the swelling, elevate your leg as much as possible the first two days  In addition, you may place ice on your knee to reduce swelling  Place a plastic bag filled with ice, wrapped in a thin towel, on your knee   Do not keep ice on for more than 15-20 minutes, repeat 4-5 TIMES A DAY, IF POSSIBLE  BLOOD CLOT PREVENTION    Unless otherwise instructed, take one 325 mg aspirin daily for the first 3 weeks following surgery  This is to help decrease the risk of blood clots  If at any time you have discomfort, swelling, or redness in the calf (behind the leg between the knee and the ankle)  or difficulty breathing or heaviness in the chest, please call the doctor immediately  TEMPERATURE      A temperature of less than 101 degrees is common for the first 1-2 days after surgery  If your temperature is elevated above 101 degrees, call the office  IF YOU HAVE ANY OTHER CONCERNS OR QUESTIONS AT ANY TIME, DO NOT HESITATE TO CALL THE OFFICE [phone]

## 2022-05-02 NOTE — H&P
Assessment/Plan:  The patient would like to proceed with right knee arthroscopy with medial meniscectomy  We discussed the procedure and risks at length, including but not limited to, infection, bleeding, wound issues, nerve injury, blood clot, stiffness, failure of procedure, and need for additional surgery  We will see the patient back at the time of surgery  Subjective:   Lay Street is a 74 y o  female with right medial meniscus tear who notes ongoing pain about the knee  Review of Systems   Constitutional: Negative  Negative for chills and fever  HENT: Negative  Negative for ear pain and sore throat  Eyes: Negative  Negative for pain and redness  Respiratory: Negative  Negative for shortness of breath and wheezing  Cardiovascular: Negative for chest pain and palpitations  Gastrointestinal: Negative  Negative for abdominal pain and blood in stool  Endocrine: Negative  Negative for polydipsia and polyuria  Genitourinary: Negative  Negative for difficulty urinating and dysuria  Musculoskeletal:        As noted in HPI   Skin: Negative  Negative for pallor and rash  Neurological: Negative  Negative for dizziness and numbness  Hematological: Negative  Negative for adenopathy  Does not bruise/bleed easily  Psychiatric/Behavioral: Negative  Negative for confusion and suicidal ideas           Past Medical History:   Diagnosis Date    Knee internal derangement, right        Past Surgical History:   Procedure Laterality Date    COLONOSCOPY      KNEE SURGERY Left     Meniscus Repair       Family History   Problem Relation Age of Onset    No Known Problems Mother     No Known Problems Father     No Known Problems Sister     No Known Problems Brother     No Known Problems Maternal Aunt     No Known Problems Maternal Uncle     No Known Problems Paternal Aunt     No Known Problems Paternal Uncle     No Known Problems Maternal Grandmother     No Known Problems Maternal Grandfather     No Known Problems Paternal Grandmother     No Known Problems Paternal Grandfather        Social History     Occupational History    Not on file   Tobacco Use    Smoking status: Former Smoker    Smokeless tobacco: Never Used   Vaping Use    Vaping Use: Never used   Substance and Sexual Activity    Alcohol use: Yes     Comment: Rare    Drug use: Never    Sexual activity: Not on file         Current Facility-Administered Medications:     lactated ringers infusion, 75 mL/hr, Intravenous, Continuous, Beth Payne MD    No Known Allergies    Objective:  Vitals:    05/02/22 1216   BP: 118/71   Pulse: 67   Resp: 18   Temp: (!) 95 °F (35 °C)   SpO2: 99%       Ortho Exam    Physical Exam  Constitutional:       General: She is not in acute distress  Appearance: She is well-developed  HENT:      Head: Normocephalic and atraumatic  Eyes:      General: No scleral icterus  Conjunctiva/sclera: Conjunctivae normal    Neck:      Vascular: No JVD  Cardiovascular:      Rate and Rhythm: Normal rate  Pulmonary:      Effort: Pulmonary effort is normal  No respiratory distress  Skin:     General: Skin is warm  Neurological:      Mental Status: She is alert and oriented to person, place, and time  Coordination: Coordination normal          Right knee: Medial joint line tenderness

## 2022-05-02 NOTE — OP NOTE
OPERATIVE REPORT  PATIENT NAME: Ritesh Alfaro    :  1955  MRN: [id number]  Pt Location: [facility] OR ROOM 01    SURGERY DATE: 2022    Surgeon(s) and Role:     * Zarina Lim MD - Primary     * Bradley Shine PA-C - Assisting necessary for the procedure for assistance with improved visualization due to the minimally invasive arthroscopic techniques utilized for the operation for assistance utilization the camera and shaver and Wand and biter  Ann MACHADO  And OTC 2nd assistant    Preop Diagnosis:  Tear of medial meniscus of right knee, current, unspecified tear type, initial encounter [S83 242A]    Post-Op Diagnosis Codes:     * Tear of medial meniscus of right knee, current, unspecified tear type, initial encounter [S83 242A]    Procedure(s) (LRB):  ARTHROSCOPY KNEE MEDIAL MENISCECTOMY (Right)    Specimen(s):  * No specimens in log *    Estimated Blood Loss:   Minimal    Drains:  * No LDAs found *    Anesthesia Type:   General    Operative Indications:  Tear of medial meniscus of right knee, current, unspecified tear type, initial encounter Familia Mauricio is a 79-year-old female who has been suffering long-term with right knee pain  She was demonstrated on MRI have a medial meniscus tear  She has been experiencing locking catching symptoms  She is known to have some arthritic changes well in that knee  She understood the risks and benefits of right knee arthroscopy with medial meniscectomy and wished to go ahead  The risks are inclusive of but not limited to infection, stiffness, nerve or blood vessel injury causing numbness pain and weakness, worsening of symptoms, failure to regain full strength and ability, blood clots, failure to achieve anticipated results, and need for further surgery  Operative Findings:  Right knee exam under anesthesia demonstrated good stability to varus and valgus stress well as anterior posterior drawer    There was an effusion noted with clear yellow fluid noted upon entrance into the knee  Range of motion was 0-120 degrees preoperatively  Intra-articular findings demonstrated grade 3-4 changes along the undersurface of the patella and trochlear groove as well as grade 3 changes along the lateral compartment and grade 3 changes along the medial compartment  Highly complex series of tears were noted along the posterior horn medial meniscus with no significant tearing noted along the lateral meniscus  ACL was intact  We did debride the medial meniscus back to a stable rim of tissue with use of a biter and shaver and utilized a Wand for peripheral bleeding control  Complications:   None    Procedure and Technique:  Edita Conway was taken to the operating room and placed supine on the OR table  She was given preoperative IV antibiotics  General anesthesia was induced in the right knee was taken through exam under anesthesia as described above  Right lower extremity was then prepped and draped in usual sterile fashion  Surgical time-out was taken  We injected local anesthetic in both portals  The anterolateral portal was made with 11 blade  Diagnostic arthroscopy begun an anteromedial portal made with 11 blade  We demonstrated significant effusion with clear yellow fluid expressed with the knee  We also demonstrated grade 3-4 articular cartilage changes along the patellofemoral region but no loose bodies in either gutter  Lateral compartment demonstrated grade 3 changes along the broad region of the plateau and femoral condyle but no obvious lateral meniscus tears  ACL was intact  Medial meniscus demonstrated highly complex series of tears along the posterior horn towards the body of the meniscus requiring debridement with a biter and shaver to a stable rim of tissue  We also utilized the Wand to cauterize any peripheral bleeding  We were pleased with our end result with stability of the medial meniscus    We then removed the arthroscopic equipment and closed the portals with 4-0 nylon suture  Local anesthetic was injected into the knee and dry, sterile dressings were applied with an Ace bandage  She tolerated the procedure well and transferred to recovery room stable condition  She will follow up in 1 week for suture removal   She will be on aspirin for DVT prophylaxis  She will start physical therapy soon as possible  She will likely need additional treatment in the future for the degenerative changes of her knee     I was present for the entire procedure and A qualified resident physician was not available    Patient Disposition:  PACU       SIGNATURE: Amalia Brown MD  DATE: May 2, 2022  TIME: 2:08 PM

## 2022-05-03 ENCOUNTER — OFFICE VISIT (OUTPATIENT)
Dept: PHYSICAL THERAPY | Facility: CLINIC | Age: 67
End: 2022-05-03
Payer: MEDICARE

## 2022-05-03 DIAGNOSIS — S83.241A OTHER TEAR OF MEDIAL MENISCUS, CURRENT INJURY, RIGHT KNEE, INITIAL ENCOUNTER: Primary | ICD-10-CM

## 2022-05-03 DIAGNOSIS — R26.9 GAIT ABNORMALITY: ICD-10-CM

## 2022-05-03 PROCEDURE — 97161 PT EVAL LOW COMPLEX 20 MIN: CPT

## 2022-05-03 NOTE — PROGRESS NOTES
PT Evaluation     Today's date: 5/3/2022  Patient name: Ruby Zeng  : 1955  MRN: [id number]  Referring provider: Juan Kohler*  Dx:   Encounter Diagnosis     ICD-10-CM    1  Other tear of medial meniscus, current injury, right knee, initial encounter  S83 241A    2  Gait abnormality  R26 9                   Assessment  Assessment details: Ruby Zeng is a 74 y o  female  who presents with pain, decreased strength, decreased ROM, decreased joint mobility, joint effusion and ambulatory dysfunction that is restricting ADL's, prolonged standing, prolonged sitting, transfers, squatting, functional mobility, recreational activities, work-related activities, lifting/carrying  She arrives today 1 day post right meniscectomy and presents with pain, swelling, and limitations in range of motion consistent with post-operative status  She presents with bilateral crutches with PWB on RLE, improved pattern with gait following cues and education  Patient would benefit from skilled physical therapy services to address their aforementioned functional limitations and progress towards prior level of function and independence with home exercise program      Patient was educated on examination findings, pathology, post-operative precautions, and initial HEP  Impairments: abnormal gait, abnormal or restricted ROM, abnormal movement, activity intolerance, impaired balance, impaired physical strength, lacks appropriate home exercise program, pain with function, weight-bearing intolerance and poor body mechanics  Understanding of Dx/Px/POC: good   Prognosis: good    Goals  STG: to be achieved within 2-4 weeks  1  Pt will be able to ambulate community distances with normal heel to toe pattern  2  Improve strength so that pt will be be able to perform sit to stand transfers without UE support  3  Improve knee ROM so they can independently don/doff shoes  4  Pt will be I with HEP  5   Pt will demo 50% inc in knee AROM  6  Patient will have girth R=L    LTG:  to be achieved within 4-8 weeks  1  Pt will be able to negotiate stairs reciprocally without hand rail assistance  2  Improve SLS balance with EC to greater than 15 seconds  3  Pt will be able to ambulate without time restrictions pain free  4  Restore knee AROM to WNL in all planes  5  Pt will demo knee strength WNL to return to PLOF  6  Patient with be able to walk unrestricted on outdoor, uneven surfaces    Plan  Plan details: HEP development, stretching, strengthening, A/AA/PROM, joint mobilizations, posture education, STM/MI as needed to reduce muscle tension, muscle reeducation, PLOC discussed and agreed upon with patient  Patient would benefit from: PT eval and skilled physical therapy  Planned modality interventions: cryotherapy  Planned therapy interventions: manual therapy, neuromuscular re-education, self care, therapeutic activities, therapeutic exercise, home exercise program, joint mobilization, balance, gait training, flexibility, strengthening, stretching and patient education  Frequency: 2x week  Duration in weeks: 12  Treatment plan discussed with: patient        Subjective Evaluation    History of Present Illness  Date of surgery: 2022  Mechanism of injury: Anderson Quinteros reports a long history of right knee pain and has had a course of formal OPPT which only provided temporary relief  She notes that the inflammation was still up and down but one day she was going up the stairs and she got extreme pain that never calmed down significantly  She also notes that she started to experience locking in the knee  She pursued surgery for her right knee which was performed 22  She notes that overall since surgery she is doing well  She notes she is doing well getting around on the crutches     Quality of life: excellent    Pain  Current pain ratin  At best pain ratin  At worst pain rating: 3    Social Support  Steps to enter house: yes  Stairs in house: yes   Lives in: multiple-level home  Lives with: spouse    Employment status: working  Treatments  Previous treatment: physical therapy  Patient Goals  Patient goals for therapy: independence with ADLs/IADLs, return to sport/leisure activities, return to work, increased strength, increased motion and improved balance  Patient goal: tolerate extended walks on uneven surfaces, tolerance bending a lifting at work,         Objective     Observations     Additional Observation Details  Incision not visualized due to surgeon request to leave post-op bandages intact       Active Range of Motion   Left Knee   Hyperextension  Flexion: 126 degrees   Extension: -1 degrees     Right Knee   Flexion: 71 degrees   Extension: 2 degrees     Passive Range of Motion     Right Knee   Flexion: 80 degrees   Extension: 0 degrees     Strength/Myotome Testing     Left Knee   Flexion: 5  Extension: 5    Right Knee   Flexion: 3  Extension: 3    Left Ankle/Foot   Dorsiflexion: 5  Plantar flexion: 5  Inversion: 5  Eversion: 5    Right Ankle/Foot   Dorsiflexion: 5  Plantar flexion: 5  Inversion: 5  Eversion: 5    Tests     Additional Tests Details  Deferred due to post op status     Swelling     Left Knee Girth Measurement (cm)   Joint line: 37 5 cm    Right Knee Girth Measurement (cm)   Joint line: 42 5 cm    Ambulation   Weight-Bearing Status   Weight-Bearing Status (Right): weight-bearing as tolerated    Assistive device used: crutches    Additional Weight-Bearing Status Details  Step to pattern with min WB on right     Functional Assessment        Comments  Sit to stand : UE use, weight shift to left              Precautions: post-op meniscectomy Right on 5/3/22  [employer] access code: [email or web address]       IE   Manuals  5/3                       Neuro Re-Ed                                        Ther Ex     Quad set  2x10 5s    gastroc stretch  3x30s    Heel slide   20x 5s hold with SOS    Hamstring stretch  3x30s    Weight shifts  10x5s hold    Gait training  5 min              Ther Activity               Gait Training               Modalities                   Insurance:  AMA/CMS Eval/ Re-eval Auth #/ Referral # Total   Visits  Start date  Expiration date Extension  Visit limitation? PT only or  PT+OT?  Co-Insurance   [employer] 5/3 No auth     [employer]  $0 Co-pay                                                         AUTH #:  Date               Visits  Authed:  Used                Remaining

## 2022-05-06 ENCOUNTER — OFFICE VISIT (OUTPATIENT)
Dept: PHYSICAL THERAPY | Facility: CLINIC | Age: 67
End: 2022-05-06
Payer: MEDICARE

## 2022-05-06 DIAGNOSIS — R26.9 GAIT ABNORMALITY: ICD-10-CM

## 2022-05-06 DIAGNOSIS — S83.241A OTHER TEAR OF MEDIAL MENISCUS, CURRENT INJURY, RIGHT KNEE, INITIAL ENCOUNTER: Primary | ICD-10-CM

## 2022-05-06 PROCEDURE — 97140 MANUAL THERAPY 1/> REGIONS: CPT

## 2022-05-06 PROCEDURE — 97110 THERAPEUTIC EXERCISES: CPT

## 2022-05-06 NOTE — PROGRESS NOTES
Daily Note     Today's date: 2022  Patient name: Coni Hart  : 1955  MRN: [id number]  Referring provider: Olivia Quijano  Dx:   Encounter Diagnosis     ICD-10-CM    1  Other tear of medial meniscus, current injury, right knee, initial encounter  S83 241A    2  Gait abnormality  R26 9                   Subjective: Coni Hart reports that she is doing her exercises, icing and doing some self retrograde massage  She notes a little click with full knee bending when doing the exercises  Objective: See treatment diary below      Assessment: Tolerated treatment well  Reviewed gait pattern with one crutch with cues for gait pattern, good performance with appropriate heel toe pattern by end of session  Educated on prone hangs to address any end-range extension deficits  Good tolerance for addition of SLR flex, ABD and ext today  Patient exhibited good technique with therapeutic exercises and would benefit from continued PT to progress range of motion, strength, normalize gait and facilitate safe progression to PLOF  Plan: Continue per plan of care        Precautions: post-op meniscectomy Right on 5/3/22  [employer] access code: [email or web address] - updated     Returns to surgeon on: 5/10/22     2 IE   Manuals 5/6 5/3   Patellar mobs  Sup/inf grade III                   Neuro Re-Ed                                        Ther Ex     Quad set 10x5s hold  2x10 5s    gastroc stretch + soleus stretch  Standing 3x30s ea 3x30s    Heel slide   20x 5s hold with SOS    Hamstring stretch  3x30s    Weight shifts  10x5s hold    Gait training 5 min 1 crutch  5 min    Upright bike     SLR  Flex, ABD, ext 2x10 ea    Prone hang  3 min               Gait Training               Modalities

## 2022-05-09 ENCOUNTER — OFFICE VISIT (OUTPATIENT)
Dept: PHYSICAL THERAPY | Facility: CLINIC | Age: 67
End: 2022-05-09
Payer: MEDICARE

## 2022-05-09 DIAGNOSIS — S83.241A OTHER TEAR OF MEDIAL MENISCUS, CURRENT INJURY, RIGHT KNEE, INITIAL ENCOUNTER: Primary | ICD-10-CM

## 2022-05-09 DIAGNOSIS — R26.9 GAIT ABNORMALITY: ICD-10-CM

## 2022-05-09 PROCEDURE — 97112 NEUROMUSCULAR REEDUCATION: CPT

## 2022-05-09 PROCEDURE — 97110 THERAPEUTIC EXERCISES: CPT

## 2022-05-09 NOTE — PROGRESS NOTES
Daily Note     Today's date: 2022  Patient name: Lucas Faye  : 1955  MRN: [id number]  Referring provider: Jono Owusu*  Dx:   Encounter Diagnosis     ICD-10-CM    1  Other tear of medial meniscus, current injury, right knee, initial encounter  S83 241A    2  Gait abnormality  R26 9                   Subjective: Lucas Faye reports that she did well walking with 1 crutch, intermittently walking without crutch  She notes she is doing her exercises at home  States she is still getting a click with bending it  Objective: See treatment diary below      Assessment: Tolerated treatment well  Good tolerance for rec bike rocking, squatting and initiation of step ups today without pain  Patient demonstrated fatigue post treatment, exhibited good technique with therapeutic exercises and would benefit from continued PT to progress pain-free range of motion, strengthening, normalize gait pattern without AD, and facilitate safe return to PLOF  Patient is returning to her surgeon tomorrow for her first post-op follow up  Plan: Continue per plan of care        Precautions: post-op meniscectomy Right on 5/3/22  [employer] access code: [email or web address] - updated     Returns to surgeon on: 5/10/22     3 2 IE   Manuals 5/9 5/6 5/3   Patellar mobs   Sup/inf grade III                      Neuro Re-Ed      Mini squat 2x10 cues      biodex  LOS static      Step ups  4" 2x10                              Ther Ex      Quad set  10x5s hold  2x10 5s    gastroc stretch + soleus stretch   Standing 3x30s ea 3x30s    Heel slide    20x 5s hold with SOS    Hamstring stretch   3x30s    Weight shifts   10x5s hold    Gait training  5 min 1 crutch  5 min    Upright bike 5 min rocking      SLR  Flex, ABD, ext 2x10 ea  Flex, ABD, ext 2x10 ea    Prone hang  3 min  3 min     Prone quad stretch  3x30s      Prone hamstring curl  Active 2x10      LAQ 2x10 5s hold      Bridges  2x10            Modalities

## 2022-05-10 ENCOUNTER — OFFICE VISIT (OUTPATIENT)
Dept: OBGYN CLINIC | Facility: CLINIC | Age: 67
End: 2022-05-10

## 2022-05-10 DIAGNOSIS — Z98.890 S/P MEDIAL MENISCECTOMY OF RIGHT KNEE: Primary | ICD-10-CM

## 2022-05-10 PROCEDURE — 99024 POSTOP FOLLOW-UP VISIT: CPT | Performed by: ORTHOPAEDIC SURGERY

## 2022-05-10 NOTE — PROGRESS NOTES
Assessment/Plan:  1  S/P medial meniscectomy of right knee       The patient is doing very well and will continue physical therapy  She can drive at this point  She can discontinue her crutch when she feels comfortable doing so  She has no signs of infection or DVT today  She can gradually increase her activity as tolerated  We did discuss that her clicking may be do to residual swelling and/or her arthritic changes  She will FU in 1 month for repeat evaluation  Subjective:   Christin Gonzalez is a 74 y o  female who presents today for follow-up of her right knee, now about a week status post arthroscopic medial meniscectomy  She is doing quite well and notes minimal pain about the knee at this point  She notes improving ROM and strength with PT  She notes minimal swelling  She does still complain of some clicking about the knee still  She notes good sensation of the right lower extremity  She denies any calf pain or cramping  She is still using 1 crutch for assistance with ambulation         Review of Systems      Past Medical History:   Diagnosis Date    Knee internal derangement, right        Past Surgical History:   Procedure Laterality Date    COLONOSCOPY      KNEE SURGERY Left     Meniscus Repair    [place] KNEE SCOPE,MED/LAT MENISECTOMY Right 5/2/2022    Procedure: ARTHROSCOPY KNEE MEDIAL MENISCECTOMY;  Surgeon: Cynthia Stover MD;  [facility]: [facility];  Service: Orthopedics       Family History   Problem Relation Age of Onset    No Known Problems Mother     No Known Problems Father     No Known Problems Sister     No Known Problems Brother     No Known Problems Maternal Aunt     No Known Problems Maternal Uncle     No Known Problems Paternal Aunt     No Known Problems Paternal Uncle     No Known Problems Maternal Grandmother     No Known Problems Maternal Grandfather     No Known Problems Paternal Grandmother     No Known Problems Paternal Grandfather        Social History Occupational History    Not on file   Tobacco Use    Smoking status: Former Smoker    Smokeless tobacco: Never Used   Vaping Use    Vaping Use: Never used   Substance and Sexual Activity    Alcohol use: Yes     Comment: Rare    Drug use: Never    Sexual activity: Not on file         Current Outpatient Medications:     Ascorbic Acid (vitamin C) 100 MG tablet, Take 100 mg by mouth daily, Disp: , Rfl:     cholecalciferol (VITAMIN D3) 1,000 units tablet, Take 1,000 Units by mouth daily, Disp: , Rfl:     Multiple Vitamin (multivitamin) tablet, Take 1 tablet by mouth daily, Disp: , Rfl:     NON FORMULARY, Cardio triplex 2 daily- last dose 4/25/22  Ortho- thyroid tab daily, Disp: , Rfl:     omega-3-acid ethyl esters (LOVAZA) 1 g capsule, Take 2 g by mouth 2 (two) times a day, Disp: , Rfl:     zinc gluconate 50 mg tablet, Take 50 mg by mouth daily, Disp: , Rfl:     No Known Allergies    Objective: There were no vitals filed for this visit  Ortho Exam    Physical Exam    Right knee: Sutures removed  Incisions CDI  No erythema  ROM 0-120 degrees  No calf tenderness  Sensation intact right lower extremity

## 2022-05-11 ENCOUNTER — OFFICE VISIT (OUTPATIENT)
Dept: PHYSICAL THERAPY | Facility: CLINIC | Age: 67
End: 2022-05-11
Payer: MEDICARE

## 2022-05-11 DIAGNOSIS — S83.241A OTHER TEAR OF MEDIAL MENISCUS, CURRENT INJURY, RIGHT KNEE, INITIAL ENCOUNTER: Primary | ICD-10-CM

## 2022-05-11 DIAGNOSIS — R26.9 GAIT ABNORMALITY: ICD-10-CM

## 2022-05-11 PROCEDURE — 97110 THERAPEUTIC EXERCISES: CPT

## 2022-05-11 PROCEDURE — 97112 NEUROMUSCULAR REEDUCATION: CPT

## 2022-05-11 NOTE — PROGRESS NOTES
Daily Note     Today's date: 2022  Patient name: Vania Lilly  : 1955  MRN: [id number]  Referring provider: Chris Page*  Dx:   Encounter Diagnosis     ICD-10-CM    1  Other tear of medial meniscus, current injury, right knee, initial encounter  S83 241A    2  Gait abnormality  R26 9                   Subjective: Vania Lilly had a follow up with her surgeon who is pleased with her progress, noting she may discontinue the 1 crutch as she is comfortable to  She notes that she over did it yesterday so she was a little swollen and sore  Objective: See treatment diary below      Assessment: Tolerated treatment well  Pt able to tolerate 4 way SLR and weighted LAQ without pain, fatigue noted  Good tolerance for 6" step ups with appropriate form noted, fatigued after 10 repetitions  Discussed HEP and managing strengthening, higher levels of activity at home, and rest breaks  Patient demonstrated fatigue post treatment, exhibited good technique with therapeutic exercises and would benefit from continued PT to progress range of motion, reduce swelling, improve strength and endurance and facilitate return to PLOF  Plan: Continue per plan of care        Precautions: post-op meniscectomy Right on 5/3/22  [employer] access code: [email or web address] - updated     Returns to surgeon on: 5/10/22     4 3 2 IE   Manuals 5/11 5/9 5/6 5/3   Patellar mobs    Sup/inf grade III                         Neuro Re-Ed       Mini squat  2x10 cues      biodex   LOS static      Step ups  6" 3x5 4" 2x10      Step downs                             Ther Ex       Quad set   10x5s hold  2x10 5s    gastroc stretch + soleus stretch    Standing 3x30s ea 3x30s    Heel slide     20x 5s hold with SOS    Hamstring stretch    3x30s    Weight shifts    10x5s hold    Gait training   5 min 1 crutch  5 min    Upright bike 5 min rocking 5 min rocking      SLR  Flex/abd/add/ ext 2x10  Flex, ABD, ext 2x10 ea  Flex, ABD, ext 2x10 ea Prone hang  3 min + 2 min  3 min  3 min     Prone quad stretch  3x30s  3x30s      Prone hamstring curl  2x10  Active 2x10      LAQ 3x10 3s hold 2lbs  2x10 5s hold      Bridges  2x10 2x10             Modalities

## 2022-05-13 ENCOUNTER — OFFICE VISIT (OUTPATIENT)
Dept: PHYSICAL THERAPY | Facility: CLINIC | Age: 67
End: 2022-05-13
Payer: MEDICARE

## 2022-05-13 DIAGNOSIS — S83.241A OTHER TEAR OF MEDIAL MENISCUS, CURRENT INJURY, RIGHT KNEE, INITIAL ENCOUNTER: Primary | ICD-10-CM

## 2022-05-13 DIAGNOSIS — R26.9 GAIT ABNORMALITY: ICD-10-CM

## 2022-05-13 PROCEDURE — 97112 NEUROMUSCULAR REEDUCATION: CPT

## 2022-05-13 PROCEDURE — 97110 THERAPEUTIC EXERCISES: CPT

## 2022-05-13 NOTE — PROGRESS NOTES
Daily Note     Today's date: 2022  Patient name: Juan Mcguire  : 1955  MRN: [id number]  Referring provider: Valentín Low*  Dx:   Encounter Diagnosis     ICD-10-CM    1  Other tear of medial meniscus, current injury, right knee, initial encounter  S83 241A    2  Gait abnormality  R26 9                   Subjective: Juan Mcguire reports that her knee feels much better today and she is doing more walking without the crutch  Objective: See treatment diary below      Assessment: Tolerated treatment well  Able to perform full revolutions on the bike without pain  Able to walk in clinic with appropriate gait pattern without AD  Good tolerance for resisted hamstring curls  Min pain noted with step ups and step downs, more limited by weakness but good form is noted  Patient demonstrated fatigue post treatment and would benefit from continued PT to progress range of motion, strength, endurance and gait  Plan: Continue per plan of care        Precautions: post-op meniscectomy Right on 5/3/22  [employer] access code: [email or web address] - updated     Returns to surgeon on: 5/10/22     5 4 3 2 IE   Manuals 5/13 5/11 5/9 5/6 5/3   Patellar mobs  Inferior/superior   Sup/inf grade III    STM Distal quad                       Neuro Re-Ed        Mini squat   2x10 cues      biodex    LOS static      Step ups  6" 2x8 6" 3x5 4" 2x10      Step downs  4" up and over 15x       Wobble board                         Ther Ex        Quad set    10x5s hold  2x10 5s    gastroc stretch + soleus stretch  gastroc 3x30s    Standing 3x30s ea 3x30s    Heel slide      20x 5s hold with SOS    Hamstring stretch     3x30s    Weight shifts     10x5s hold    Gait training    5 min 1 crutch  5 min    Upright bike 5 min  5 min rocking 5 min rocking      SLR   Flex/abd/add/ ext 2x10  Flex, ABD, ext 2x10 ea  Flex, ABD, ext 2x10 ea    Prone hang   3 min + 2 min  3 min  3 min     Prone quad stretch  3x30s  3x30s  3x30s      Prone hamstring curl  2x10 2lbs  2x10  Active 2x10      LAQ  3x10 3s hold 2lbs  2x10 5s hold      Bridges   2x10 2x10              Modalities

## 2022-05-16 ENCOUNTER — OFFICE VISIT (OUTPATIENT)
Dept: PHYSICAL THERAPY | Facility: CLINIC | Age: 67
End: 2022-05-16
Payer: MEDICARE

## 2022-05-16 DIAGNOSIS — R26.9 GAIT ABNORMALITY: ICD-10-CM

## 2022-05-16 DIAGNOSIS — S83.241A OTHER TEAR OF MEDIAL MENISCUS, CURRENT INJURY, RIGHT KNEE, INITIAL ENCOUNTER: Primary | ICD-10-CM

## 2022-05-16 PROCEDURE — 97140 MANUAL THERAPY 1/> REGIONS: CPT

## 2022-05-16 PROCEDURE — 97110 THERAPEUTIC EXERCISES: CPT

## 2022-05-16 PROCEDURE — 97112 NEUROMUSCULAR REEDUCATION: CPT

## 2022-05-16 NOTE — PROGRESS NOTES
Daily Note     Today's date: 2022  Patient name: Andrea Liriano  : 1955  MRN: [id number]  Referring provider: Hayley Chester*  Dx:   Encounter Diagnosis     ICD-10-CM    1  Other tear of medial meniscus, current injury, right knee, initial encounter  S83 241A    2  Gait abnormality  R26 9                   Subjective: Andrea Liriano reports that she isnt using the crutches at home other than on the stairs  She feels that her limitation with the stairs is more mental        Objective: See treatment diary below      Assessment: Tolerated treatment well  Good tolerance for step ups on 8" step, no pain  Limitations in step downs due to eccentric weakness of quad as well as pelvic drop noted  Addition of side stepping to initiate WB hip abductor activation, good tolerance  reviewed squat form with cues provided  Patient demonstrated fatigue post treatment and would benefit from continued PT to address ms restrictions, strength, body mechanics and facilitate return to PLOF  Plan: Continue per plan of care        Precautions: post-op meniscectomy Right on 5/3/22  [employer] access code: [email or web address] - updated     Returns to surgeon on: 5/10/22     6 5 4 3   Manuals    Patellar mobs   Inferior/superior     STM Popliteus release  Distal quad                   Neuro Re-Ed       Mini squat    2x10 cues    biodex     LOS static    Step ups  8" 20x  6" 2x8 6" 3x5 4" 2x10    Step downs  Lateral small range 10x with cues  4" up and over 15x     Wobble board        Side stepping  2 laps              Ther Ex       Quad set       gastroc stretch + soleus stretch  Standing 2x45s ea R  gastroc 3x30s      Heel slide        Hamstring stretch       Weight shifts       Gait training       Upright bike 5 min  5 min  5 min rocking 5 min rocking    SLR    Flex/abd/add/ ext 2x10  Flex, ABD, ext 2x10 ea    Prone hang    3 min + 2 min  3 min    Prone quad stretch  3x30s  3x30s  3x30s  3x30s    Prone hamstring curl  10x  2x10 2lbs  2x10  Active 2x10    LAQ   3x10 3s hold 2lbs  2x10 5s hold    Bridges    2x10 2x10           Modalities

## 2022-05-18 ENCOUNTER — EVALUATION (OUTPATIENT)
Dept: PHYSICAL THERAPY | Facility: CLINIC | Age: 67
End: 2022-05-18
Payer: MEDICARE

## 2022-05-18 DIAGNOSIS — S83.241A OTHER TEAR OF MEDIAL MENISCUS, CURRENT INJURY, RIGHT KNEE, INITIAL ENCOUNTER: Primary | ICD-10-CM

## 2022-05-18 DIAGNOSIS — R26.9 GAIT ABNORMALITY: ICD-10-CM

## 2022-05-18 PROCEDURE — 97112 NEUROMUSCULAR REEDUCATION: CPT

## 2022-05-18 PROCEDURE — 97110 THERAPEUTIC EXERCISES: CPT

## 2022-05-18 NOTE — PROGRESS NOTES
PT Re-Evaluation     Today's date: 2022  Patient name: Donn Guillen  : 1955  MRN: [id number]  Referring provider: Jazlyn Avilez  Dx:   Encounter Diagnosis     ICD-10-CM    1  Other tear of medial meniscus, current injury, right knee, initial encounter  S83 241A    2  Gait abnormality  R26 9                   Assessment  Assessment details: Donn Guillen has been seen for 7 visits following a right meniscectomy  At this time she is independently ambulating without AD, improving ability to ascend stairs, and independence with light ADLs  She does demonstrate restrictions in ability to descend stairs, squat, and deficits in balance that impact her ability to walk on uneven ground on her family's property and perform all necessary work duties  At this time she is demonstrating improving strength in right quadriceps and hip musculature which correlates with her improvements in functional performance of ADLs such as cooking    Patient would benefit from skilled physical therapy services to address their aforementioned functional limitations and progress towards prior level of function and independence with home exercise program      Patient was educated on examination findings, pathology, post-operative precautions, and initial HEP  Impairments: abnormal gait, abnormal or restricted ROM, abnormal movement, activity intolerance, impaired balance, impaired physical strength, lacks appropriate home exercise program, pain with function, weight-bearing intolerance and poor body mechanics  Understanding of Dx/Px/POC: good   Prognosis: good    Goals  STG: to be achieved within 2-4 weeks  1  Pt will be able to ambulate community distances with normal heel to toe pattern  MET  2  Improve strength so that pt will be be able to perform sit to stand transfers without UE support  MET  3  Improve knee ROM so they can independently don/doff shoes  - MET  4  Pt will be I with HEP - MET  5   Pt will demo 50% inc in knee AROM - MET  6  Patient will have girth R=L - MET    LTG:  to be achieved within 4-8 weeks  1  Pt will be able to negotiate stairs reciprocally without hand rail assistance  - progressing   2  Improve SLS balance with EC to greater than 15 seconds  - not met  3  Pt will be able to ambulate without time restrictions pain free  - progressing   4  Restore knee AROM to WNL in all planes  5  Pt will demo knee strength WNL to return to PLOF  6  Patient with be able to walk unrestricted on outdoor, uneven surfaces    Plan  Plan details: HEP development, stretching, strengthening, A/AA/PROM, joint mobilizations, posture education, STM/MI as needed to reduce muscle tension, muscle reeducation, PLOC discussed and agreed upon with patient  Patient would benefit from: PT eval and skilled physical therapy  Planned modality interventions: cryotherapy  Planned therapy interventions: manual therapy, neuromuscular re-education, self care, therapeutic activities, therapeutic exercise, home exercise program, joint mobilization, balance, gait training, flexibility, strengthening, stretching and patient education  Frequency: 2x week  Duration in weeks: 12  Treatment plan discussed with: patient        Subjective Evaluation    History of Present Illness  Date of surgery: 2022  Mechanism of injury: Patient reports that she is currently still not doing any of her heavy outside work, but 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, creating lavender sashes)  She notes that she is doing about 40% of her ADLs including cooking  She notes she is no longer using the crutches inside or outside  She also states that up stairs is not an issue, down she is still cautious due to low level pain     Quality of life: excellent    Pain  Current pain ratin  At best pain ratin  At worst pain ratin    Social Support  Steps to enter house: yes  Stairs in house: yes   Lives in: multiple-level home  Lives with: spouse    Employment status: working  Treatments  Previous treatment: physical therapy  Patient Goals  Patient goals for therapy: independence with ADLs/IADLs, return to sport/leisure activities, return to work, increased strength, increased motion and improved balance  Patient goal: tolerate extended walks on uneven surfaces, tolerance bending a lifting at work,         Objective     Observations     Additional Observation Details  Incisions well healed     Active Range of Motion   Left Knee   Hyperextension  Flexion: 126 degrees   Extension: -1 degrees     Right Knee   Flexion: 119 degrees   Extension: 0 degrees     Passive Range of Motion     Right Knee   Flexion: 122 degrees   Extension: 0 degrees     Strength/Myotome Testing     Left Knee   Flexion: 5  Extension: 5    Right Knee   Flexion: 4+  Extension: 4    Left Ankle/Foot   Dorsiflexion: 5  Plantar flexion: 5  Inversion: 5  Eversion: 5    Right Ankle/Foot   Dorsiflexion: 5  Plantar flexion: 5  Inversion: 5  Eversion: 5    Tests     Additional Tests Details  Deferred due to post op status     Swelling     Left Knee Girth Measurement (cm)   Joint line: 37 5 cm    Right Knee Girth Measurement (cm)   Joint line: 38 5 cm    Ambulation   Weight-Bearing Status   Weight-Bearing Status (Right): weight-bearing as tolerated    Assistive device used: crutches    Additional Weight-Bearing Status Details  Step to pattern with min WB on right     Functional Assessment        Forward Step Up 8"   Left Leg  Within functional limits  Right Leg  Increased contralateral push off  Forward Step Down 8"   Left Leg  Within functional limits       Right Leg  Pain, contralateral trunk side bending and contralateral toe touch first      Single Leg Stance - Eyes Open   Left  Trial 1: 30 seconds    Right  Trial 1: 30 seconds    Single Leg Stance - Eyes Closed   Left  Trial 1: 3 seconds  Trial 2: 10 seconds  Trial 3: 4 seconds  Average: 5 67 seconds    Right  Trial 1: 2 seconds  Trial 2: 3 seconds  Trial 3: 2 seconds  Average: 2 33 seconds    Comments  Sit to stand : equal WB, no UE use             Precautions: post-op meniscectomy Right on 5/3/22  [employer] access code: [email or web address]      7 6 5 4 3   Manuals 5/18 5/16 5/13 5/11 5/9   Patellar mobs    Inferior/superior     STM  Popliteus release  Distal quad                     Neuro Re-Ed        Mini squat     2x10 cues    biodex      LOS static    Step ups  6" 8x 8" 20x  6" 2x8 6" 3x5 4" 2x10    Step downs  6" 8x  Lateral small range 10x with cues  4" up and over 15x     Wobble board         Side stepping  2 laps  2 laps               Ther Ex        Quad set        gastroc stretch + soleus stretch  Standing 2x45s Standing 2x45s ea R  gastroc 3x30s      Heel slide  With quad set 30x       Hamstring stretch        Weight shifts        Gait training        Upright bike 5 min  5 min  5 min  5 min rocking 5 min rocking    SLR  Flex/abd/add/ext 3x10 ea    Flex/abd/add/ ext 2x10  Flex, ABD, ext 2x10 ea    Prone hang     3 min + 2 min  3 min    Prone quad stretch   3x30s  3x30s  3x30s  3x30s    Prone hamstring curl   10x  2x10 2lbs  2x10  Active 2x10    LAQ    3x10 3s hold 2lbs  2x10 5s hold    Bridges  3x10   2x10 2x10            Modalities

## 2022-05-20 ENCOUNTER — OFFICE VISIT (OUTPATIENT)
Dept: PHYSICAL THERAPY | Facility: CLINIC | Age: 67
End: 2022-05-20
Payer: MEDICARE

## 2022-05-20 DIAGNOSIS — R26.9 GAIT ABNORMALITY: ICD-10-CM

## 2022-05-20 DIAGNOSIS — S83.241A OTHER TEAR OF MEDIAL MENISCUS, CURRENT INJURY, RIGHT KNEE, INITIAL ENCOUNTER: Primary | ICD-10-CM

## 2022-05-20 PROCEDURE — 97112 NEUROMUSCULAR REEDUCATION: CPT

## 2022-05-20 PROCEDURE — 97110 THERAPEUTIC EXERCISES: CPT

## 2022-05-20 NOTE — PROGRESS NOTES
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 and did some prolonged standing so the knee got a little swollen  She notes she took a bit of a break yesterday and now the swelling is down  She notes that going down stairs is still limited  Objective: See treatment diary below      Assessment: Tolerated treatment well  Good tolerance for squatting with use of biodex to ensure appropriate weight distribution  Good tolerance for balance training today to address stability deficits that impact her ability to ambulate on uneven surfaces  Patient demonstrated fatigue post treatment, exhibited good technique with therapeutic exercises and would benefit from continued PT to progress strength, endurance, and facilitate return to PLOF  Plan: Continue per plan of care  Progress treatment as tolerated         Precautions: post-op meniscectomy Right on 5/3/22  [employer] access code: [email or web address]      8 7 6 5   Manuals    Patellar mobs     Inferior/superior   STM   Popliteus release  Distal quad                 Neuro Re-Ed       Mini squat 2 x2 min on biodex       biodex        Step ups   6" 8x 8" 20x  6" 2x8   Step downs  4" 3x8 6" 8x  Lateral small range 10x with cues  4" up and over 15x   Wobble board  2 min controlled ML      Side stepping  3 laps  2 laps  2 laps     Tandem walks on airex  4 laps       SLS 3x20s R       Ther Ex       Quad set       gastroc stretch + soleus stretch  Standing 2x30s ea R  Standing 2x45s Standing 2x45s ea R  gastroc 3x30s    Heel slide   With quad set 30x Hamstring stretch       Weight shifts       Gait training       Upright bike 5 min  5 min  5 min  5 min    SLR   Flex/abd/add/ext 3x10 ea      Prone hang        Prone quad stretch  Mod bright stretch 3x30s   3x30s  3x30s    Prone hamstring curl    10x  2x10 2lbs    LAQ       Bridges   3x10            Modalities

## 2022-05-23 ENCOUNTER — OFFICE VISIT (OUTPATIENT)
Dept: PHYSICAL THERAPY | Facility: CLINIC | Age: 67
End: 2022-05-23
Payer: MEDICARE

## 2022-05-23 DIAGNOSIS — S83.241A OTHER TEAR OF MEDIAL MENISCUS, CURRENT INJURY, RIGHT KNEE, INITIAL ENCOUNTER: Primary | ICD-10-CM

## 2022-05-23 DIAGNOSIS — R26.9 GAIT ABNORMALITY: ICD-10-CM

## 2022-05-23 PROCEDURE — 97140 MANUAL THERAPY 1/> REGIONS: CPT

## 2022-05-23 PROCEDURE — 97110 THERAPEUTIC EXERCISES: CPT

## 2022-05-23 NOTE — PROGRESS NOTES
Daily Note     Today's date: 2022  Patient name: Vania Lilly  : 1955  MRN: [id number]  Referring provider: Chris Page*  Dx:   Encounter Diagnosis     ICD-10-CM    1  Other tear of medial meniscus, current injury, right knee, initial encounter  S83 241A    2  Gait abnormality  R26 9                   Subjective: Vania Lilly reports that she took it easy on Saturday but she did a decent amount of walking on uneven ground but she notes she used the crutches a bit  Objective: See treatment diary below      Assessment: Tolerated treatment well  TTP noted along quadriceps and TFL, addressed with STM  Addition of single leg RDLs and side lunges with slider to address hip ms activation, good tolerance noted bilaterally  Patient demonstrated fatigue post treatment and exhibited good technique with therapeutic exercises, continued skilled OPPT is advised to address weakness and instability  Plan: Progress treatment as tolerated         Precautions: post-op meniscectomy Right on 5/3/22  [employer] access code: [email or web address]      9 8 7 6 5   Manuals    Patellar mobs      Inferior/superior   STM Quad, TFL, adductors    Popliteus release  Distal quad                   Neuro Re-Ed        Mini squat  2 x2 min on biodex       biodex         Step ups    6" 8x 8" 20x  6" 2x8   Step downs   4" 3x8 6" 8x  Lateral small range 10x with cues  4" up and over 15x   Wobble board   2 min controlled ML      Side stepping  2 laps  3 laps  2 laps  2 laps     Tandem walks on airex   4 laps       SLS  3x20s R       SL RDL  2x10 ea        Ther Ex        Quad set        gastroc stretch + soleus stretch   Standing 2x30s ea R  Standing 2x45s Standing 2x45s ea R  gastroc 3x30s    Heel slide    With quad set 30x     Hamstring stretch        Weight shifts        Gait training        Upright bike 5 min  5 min  5 min  5 min  5 min    SLR  ABD 3x10   Flex/abd/add/ext 3x10 ea      Prone hang Prone quad stretch  Mod bright stretch 3x30s  Mod bright stretch 3x30s   3x30s  3x30s    Prone hamstring curl     10x  2x10 2lbs    LAQ        Bridges    3x10     Hip ext  Bent over @ table 3x10        Lunges  Lateral with slider 10x ea

## 2022-05-25 ENCOUNTER — APPOINTMENT (OUTPATIENT)
Dept: PHYSICAL THERAPY | Facility: CLINIC | Age: 67
End: 2022-05-25
Payer: MEDICARE

## 2022-05-27 ENCOUNTER — OFFICE VISIT (OUTPATIENT)
Dept: PHYSICAL THERAPY | Facility: CLINIC | Age: 67
End: 2022-05-27
Payer: MEDICARE

## 2022-05-27 DIAGNOSIS — S83.241A OTHER TEAR OF MEDIAL MENISCUS, CURRENT INJURY, RIGHT KNEE, INITIAL ENCOUNTER: Primary | ICD-10-CM

## 2022-05-27 DIAGNOSIS — R26.9 GAIT ABNORMALITY: ICD-10-CM

## 2022-05-27 PROCEDURE — 97112 NEUROMUSCULAR REEDUCATION: CPT

## 2022-05-27 PROCEDURE — 97110 THERAPEUTIC EXERCISES: CPT

## 2022-05-27 PROCEDURE — 97140 MANUAL THERAPY 1/> REGIONS: CPT

## 2022-05-27 NOTE — PROGRESS NOTES
Daily Note     Today's date: 2022  Patient name: Wendi Avilez  : 1955  MRN: [id number]  Referring provider: Jazmin Ojeda*  Dx:   Encounter Diagnosis     ICD-10-CM    1  Other tear of medial meniscus, current injury, right knee, initial encounter  S83 241A    2  Gait abnormality  R26 9                   Subjective: Wendi Avilez reports she has been doing more at home and that she is tolerating doing more on her own  She notes she has been doing the stairs reciprocally       Objective: See treatment diary below      Assessment: Tolerated treatment well  TTP and restrictions noted at distal quadriceps which improved with STM  She demonstrates good tolerance for leg press today  Patient exhibited good technique with therapeutic exercises and would benefit from continued PT to progress strengthening, endurance, balance and coordination  Plan: Continue per plan of care        Precautions: post-op meniscectomy Right on 5/3/22  [employer] access code: [email or web address]      10 9 8 7   Manuals    Patellar mobs        STM Quad, TFL Quad, TFL, adductors                    Neuro Re-Ed       Mini squat   2 x2 min on biodex     biodex  Holds lvl 1 2 min       Step ups     6" 8x   Step downs    4" 3x8 6" 8x    Wobble board    2 min controlled ML    Side stepping   2 laps  3 laps  2 laps    Tandem walks on airex    4 laps     SLS Lunge onto bosu 15x5s hold R   3x20s R     SL RDL   2x10 ea      Ther Ex       Quad set       gastroc stretch + soleus stretch  2x30s   Standing 2x30s ea R  Standing 2x45s   Heel slide     With quad set 30x   Hamstring stretch       Weight shifts       Gait training       Upright bike 5 min  5 min  5 min  5 min    SLR   ABD 3x10   Flex/abd/add/ext 3x10 ea    Prone hang        Prone quad stretch  Off edge of chair 3x30s  Mod bright stretch 3x30s  Mod bright stretch 3x30s     Prone hamstring curl  Standing 3lbs 2x10 ea      LAQ       Bridges  3x10    3x10   Hip ext Bent over @ table 3x10  Bent over @ table 3x10      Lunges   Lateral with slider 10x ea      Leg press 55lbs DL 3x10   20lbs SL 2x10

## 2022-05-31 ENCOUNTER — OFFICE VISIT (OUTPATIENT)
Dept: PHYSICAL THERAPY | Facility: CLINIC | Age: 67
End: 2022-05-31
Payer: MEDICARE

## 2022-05-31 DIAGNOSIS — R26.9 GAIT ABNORMALITY: ICD-10-CM

## 2022-05-31 DIAGNOSIS — S83.241A OTHER TEAR OF MEDIAL MENISCUS, CURRENT INJURY, RIGHT KNEE, INITIAL ENCOUNTER: Primary | ICD-10-CM

## 2022-05-31 PROCEDURE — 97112 NEUROMUSCULAR REEDUCATION: CPT

## 2022-05-31 PROCEDURE — 97110 THERAPEUTIC EXERCISES: CPT

## 2022-05-31 NOTE — PROGRESS NOTES
Daily Note     Today's date: 2022  Patient name: Edouard Collins  : 1955  MRN: [id number]  Referring provider: Jonathon Knight*  Dx:   Encounter Diagnosis     ICD-10-CM    1  Other tear of medial meniscus, current injury, right knee, initial encounter  S83 241A    2  Gait abnormality  R26 9                   Subjective: Edouard Collins reports that there was a point on Saturday she felt like she forgot about her knee  She notes that she did a lot of walking and feels fatigued but denies pain  Objective: See treatment diary below      Assessment: Tolerated treatment well  Good tolerance for squats and stairs today without pain, mild fatigue noted  Improvements in balance noted with DL squat on bosu as well as SLS on black disc  Addition of standing hip ABD/ADD to address SL stability as well as hip strength, good performance  Patient demonstrated fatigue post treatment and exhibited good technique with therapeutic exercises, continue to progress strength and stability to improve participation in ADLs  Plan: Continue per plan of care        Precautions: post-op meniscectomy Right on 5/3/22  [employer] access code: [email or web address]   Re-eval:      11 10 9 8   Manuals    Patellar mobs        STM  Quad, TFL Quad, TFL, adductors                   Neuro Re-Ed       Mini squat On bosu 3x10   2 x2 min on biodex    biodex   Holds lvl 1 2 min      Step ups  Up and over 20x 6"       Step downs     4" 3x8   Wobble board     2 min controlled ML   Side stepping  3 laps   2 laps  3 laps    Tandem walks on airex     4 laps    SLS On black disc 3x30s  Lunge onto bosu 15x5s hold R   3x20s R    SL RDL    2x10 ea     Ther Ex       Quad set       gastroc stretch + soleus stretch  2x30s ea  2x30s   Standing 2x30s ea R    Heel slide        Hamstring stretch       Standing hip abd/add magaly 15x ea       Gait training       Upright bike  5 min  5 min  5 min    SLR    ABD 3x10     Prone hang Prone quad stretch  Off edge of chair 3x30s  Off edge of chair 3x30s  Mod bright stretch 3x30s  Mod bright stretch 3x30s    Prone hamstring curl   Standing 3lbs 2x10 ea     LAQ 3lbs 3x10       Bridges   3x10      Hip ext  Bent over @ table 3x10 3lbs  Bent over @ table 3x10  Bent over @ table 3x10     Lunges    Lateral with slider 10x ea     Leg press  55lbs DL 3x10   20lbs SL 2x10

## 2022-06-02 ENCOUNTER — OFFICE VISIT (OUTPATIENT)
Dept: PHYSICAL THERAPY | Facility: CLINIC | Age: 67
End: 2022-06-02
Payer: MEDICARE

## 2022-06-02 DIAGNOSIS — R26.9 GAIT ABNORMALITY: ICD-10-CM

## 2022-06-02 DIAGNOSIS — S83.241A OTHER TEAR OF MEDIAL MENISCUS, CURRENT INJURY, RIGHT KNEE, INITIAL ENCOUNTER: Primary | ICD-10-CM

## 2022-06-02 PROCEDURE — 97140 MANUAL THERAPY 1/> REGIONS: CPT

## 2022-06-02 PROCEDURE — 97110 THERAPEUTIC EXERCISES: CPT

## 2022-06-02 NOTE — PROGRESS NOTES
Daily Note     Today's date: 2022  Patient name: Donn Guillen  : 1955  MRN: [id number]  Referring provider: Jazlyn Serra*  Dx:   Encounter Diagnosis     ICD-10-CM    1  Other tear of medial meniscus, current injury, right knee, initial encounter  S83 241A    2  Gait abnormality  R26 9                   Subjective: Donn Guillen reports that after last session she was watching her grandchildren so she was on her feet all day  She notes that she was very sore and had to do a lot of stretching all day yesterday  Objective: See treatment diary below      Assessment: Tolerated treatment well  Swelling noted at knee today as well as restricted patellar inferior glide  Good tolerance for STM at distal quad and adductors and patellar mobs  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  Patient exhibited good technique with therapeutic exercises and good tolerance for manual intervention  Patient will be away next week, educated to continue with HEP and stretching program, will see patient once she returns from vacation  Plan: Continue per plan of care        Precautions: post-op meniscectomy Right on 5/3/22  [employer] access code: [email or web address]   Re-eval:      12 11 10 9   Manuals    Patellar mobs  Inferior patellar mobs       STM Quad, adductors  Quad, TFL Quad, TFL, adductors    Tibiofemoral distraction Grade III             Neuro Re-Ed       Mini squat  On bosu 3x10     biodex    Holds lvl 1 2 min     Step ups   Up and over 20x 6"      Step downs        Wobble board        Side stepping   3 laps   2 laps    Tandem walks on airex        SLS  On black disc 3x30s  Lunge onto bosu 15x5s hold R     SL RDL     2x10 ea    Ther Ex       Quad set       gastroc stretch + soleus stretch  3x30s ea R  2x30s ea  2x30s     Heel slide        Hamstring stretch       Standing hip abd/add  magaly 15x ea      Gait training Upright bike   5 min  5 min    SLR  abd 3x10   ABD 3x10    Prone hang        Prone quad stretch  Mod bright stretch 3x30s R  Off edge of chair 3x30s  Off edge of chair 3x30s  Mod bright stretch 3x30s    Prone hamstring curl    Standing 3lbs 2x10 ea    LAQ  3lbs 3x10      Bridges  3x10   3x10     Hip ext   Bent over @ table 3x10 3lbs  Bent over @ table 3x10  Bent over @ table 3x10    Lunges     Lateral with slider 10x ea    Leg press   55lbs DL 3x10   20lbs SL 2x10

## 2022-06-13 ENCOUNTER — OFFICE VISIT (OUTPATIENT)
Dept: PHYSICAL THERAPY | Facility: CLINIC | Age: 67
End: 2022-06-13
Payer: MEDICARE

## 2022-06-13 DIAGNOSIS — S83.241A OTHER TEAR OF MEDIAL MENISCUS, CURRENT INJURY, RIGHT KNEE, INITIAL ENCOUNTER: Primary | ICD-10-CM

## 2022-06-13 DIAGNOSIS — R26.9 GAIT ABNORMALITY: ICD-10-CM

## 2022-06-13 PROCEDURE — 97110 THERAPEUTIC EXERCISES: CPT

## 2022-06-13 NOTE — PROGRESS NOTES
Daily Note     Today's date: 2022  Patient name: Lennox Potters  : 1955  MRN: [id number]  Referring provider: April Edwards*  Dx:   Encounter Diagnosis     ICD-10-CM    1  Other tear of medial meniscus, current injury, right knee, initial encounter  S83 241A    2  Gait abnormality  R26 9                   Subjective: Lennox Potters reports she did well on her trip, reporting she feels 75% improvement in the knee overall  She notes that the knee still fatigues and gets mild swelling but she is not having pain the way she used to  Objective: See treatment diary below      Assessment: Tolerated treatment well  Good tolerance for hip strengthening and balancing training without pain or valgus noted  Patient demonstrated fatigue post treatment and exhibited good technique with therapeutic exercises  Patient is returning to her surgeon tomorrow, will follow up and determine appropriate course for discharge  Plan: Potential discharge next visit       Precautions: post-op meniscectomy Right on 5/3/22  [employer] access code: [email or web address]   Re-eval:      13 12 11 10 9   Manuals    Patellar mobs   Inferior patellar mobs       STM  Quad, adductors  Quad, TFL Quad, TFL, adductors    Tibiofemoral distraction  Grade III              Neuro Re-Ed        Mini squat   On bosu 3x10     biodex  Static hold   LOS 2x lvl 10    Holds lvl 1 2 min     Step ups    Up and over 20x 6"      Step downs         Wobble board  Lunge onto bosu 2x10 ea        Side stepping    3 laps   2 laps    Tandem walks on airex         SLS   On black disc 3x30s  Lunge onto bosu 15x5s hold R     SL RDL      2x10 ea    Ther Ex        Quad set        gastroc stretch + soleus stretch   3x30s ea R  2x30s ea  2x30s     Heel slide         Hamstring stretch        Standing hip abd/add   magaly 15x ea      Gait training        Upright bike    5 min  5 min    SLR  Flex/ABD/ADD ext 2x10 ea Jordon  abd 3x10   ABD 3x10 Prone hang         Prone quad stretch  Mod bright 3x30s R  Mod bright stretch 3x30s R  Off edge of chair 3x30s  Off edge of chair 3x30s  Mod bright stretch 3x30s    Prone hamstring curl     Standing 3lbs 2x10 ea    LAQ   3lbs 3x10      Bridges  3x10  3x10   3x10     Hip ext    Bent over @ table 3x10 3lbs  Bent over @ table 3x10  Bent over @ table 3x10    Lunges      Lateral with slider 10x ea    Leg press    55lbs DL 3x10   20lbs SL 2x10

## 2022-06-14 ENCOUNTER — OFFICE VISIT (OUTPATIENT)
Dept: OBGYN CLINIC | Facility: CLINIC | Age: 67
End: 2022-06-14

## 2022-06-14 DIAGNOSIS — Z98.890 S/P MEDIAL MENISCECTOMY OF RIGHT KNEE: Primary | ICD-10-CM

## 2022-06-14 DIAGNOSIS — M17.11 PRIMARY OSTEOARTHRITIS OF RIGHT KNEE: ICD-10-CM

## 2022-06-14 PROCEDURE — 99024 POSTOP FOLLOW-UP VISIT: CPT | Performed by: ORTHOPAEDIC SURGERY

## 2022-06-14 NOTE — PROGRESS NOTES
Assessment/Plan:  1  S/P medial meniscectomy of right knee     2  Primary osteoarthritis of right knee  Injection procedure prior authorization     The patient did have significant arthritis of her knee noted at the time of arthroscopy, and this is likely the source of her pain and swelling at this point  She has already failed steroid injections, however we did discuss viscosupplementation injections today  She would like to proceed with these  We will start this series once authorized by insurance  She may require a knee replacement in the future if she fails all conservative treatment  Subjective:   Luh Cervantes is a 74 y o  female who presents today for follow-up of her right knee, now about 6 weeks status post arthroscopic menisectomy  She had grade 3-4 arthritic changes of all 3 compartments of the knee noted at the time of arthroscopy as well  She notes she has made some progress , but still complains of some pain about the knee as well as swelling  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, as she thinks this might irritate her knee  She just finished physical therapy for this  She notes good ROM of the knee and good sensation of the right lower extremity       Review of Systems      Past Medical History:   Diagnosis Date    Knee internal derangement, right        Past Surgical History:   Procedure Laterality Date    COLONOSCOPY      KNEE SURGERY Left     Meniscus Repair    [place] KNEE SCOPE,MED/LAT MENISECTOMY Right 5/2/2022    Procedure: ARTHROSCOPY KNEE MEDIAL MENISCECTOMY;  Surgeon: Yarelis Hudson MD;  [facility];  Service: Orthopedics       Family History   Problem Relation Age of Onset    No Known Problems Mother     No Known Problems Father     No Known Problems Sister     No Known Problems Brother     No Known Problems Maternal Aunt     No Known Problems Maternal Uncle     No Known Problems Paternal Aunt     No Known Problems Paternal Uncle     No Known Problems Maternal Grandmother     No Known Problems Maternal Grandfather     No Known Problems Paternal Grandmother     No Known Problems Paternal Grandfather        Social History     Occupational History    Not on file   Tobacco Use    Smoking status: Former Smoker    Smokeless tobacco: Never Used   Vaping Use    Vaping Use: Never used   Substance and Sexual Activity    Alcohol use: Yes     Comment: Rare    Drug use: Never    Sexual activity: Not on file         Current Outpatient Medications:     Ascorbic Acid (vitamin C) 100 MG tablet, Take 100 mg by mouth daily, Disp: , Rfl:     cholecalciferol (VITAMIN D3) 1,000 units tablet, Take 1,000 Units by mouth daily, Disp: , Rfl:     Multiple Vitamin (multivitamin) tablet, Take 1 tablet by mouth daily, Disp: , Rfl:     NON FORMULARY, Cardio triplex 2 daily- last dose 4/25/22  Ortho- thyroid tab daily, Disp: , Rfl:     omega-3-acid ethyl esters (LOVAZA) 1 g capsule, Take 2 g by mouth 2 (two) times a day, Disp: , Rfl:     zinc gluconate 50 mg tablet, Take 50 mg by mouth daily, Disp: , Rfl:     No Known Allergies    Objective: There were no vitals filed for this visit  Right Knee Exam     Tenderness   The patient is experiencing tenderness in the medial joint line and lateral joint line  Range of Motion   Extension: 0   Flexion: 110     Tests   Varus: negative Valgus: positive  Drawer:  Anterior - negative    Posterior - negative    Other   Erythema: absent  Sensation: normal  Pulse: present  Effusion: effusion (1+) present          Observations     Right Knee   Positive for effusion (1+)  Physical Exam  Musculoskeletal:      Right knee: Effusion (1+) present

## 2022-06-14 NOTE — PROGRESS NOTES
Patient had follow up with her surgeons office today who recommended gel injections for the knee  Brad Nolasco verbalized at her session that she would like to wrap up therapy and continue with HEP  Educated patient to have her follow up with her surgeon prior to formal discharge  At this time she will be formally discharged to [facility], if she returns to therapy a formal evaluation would need to be performed

## 2022-07-05 ENCOUNTER — PROCEDURE VISIT (OUTPATIENT)
Dept: OBGYN CLINIC | Facility: CLINIC | Age: 67
End: 2022-07-05
Payer: MEDICARE

## 2022-07-05 DIAGNOSIS — M25.561 CHRONIC PAIN OF RIGHT KNEE: ICD-10-CM

## 2022-07-05 DIAGNOSIS — G89.29 CHRONIC PAIN OF RIGHT KNEE: ICD-10-CM

## 2022-07-05 DIAGNOSIS — M17.11 PRIMARY OSTEOARTHRITIS OF RIGHT KNEE: Primary | ICD-10-CM

## 2022-07-05 PROCEDURE — 20610 DRAIN/INJ JOINT/BURSA W/O US: CPT | Performed by: PHYSICIAN ASSISTANT

## 2022-07-05 RX ORDER — HYALURONATE SODIUM 10 MG/ML
20 SYRINGE (ML) INTRAARTICULAR
Status: COMPLETED | OUTPATIENT
Start: 2022-07-05 | End: 2022-07-05

## 2022-07-05 RX ADMIN — Medication 20 MG: at 08:13

## 2022-07-05 NOTE — PROGRESS NOTES
Assessment/Plan:  1  Primary osteoarthritis of right knee  Large joint arthrocentesis   2  Chronic pain of right knee  Large joint arthrocentesis     Teagan is a pleasant 79-year-old female presenting for the 1st of 3 Euflexxa injections for her right knee osteoarthritis  She consented to and underwent the injection as detailed below, which she tolerated well without difficulty or complication  Post injection instructions were provided  See her next week and the week after to complete the series    Large joint arthrocentesis: R knee  Universal Protocol:  Consent: Verbal consent obtained  Risks and benefits: risks, benefits and alternatives were discussed  Consent given by: patient  Time out: Immediately prior to procedure a "time out" was called to verify the correct patient, procedure, equipment, support staff and site/side marked as required  Timeout called at: 7/5/2022 8:02 AM   Site marked: the operative site was marked  Patient identity confirmed: verbally with patient    Supporting Documentation  Indications: pain   Procedure Details  Location: knee - R knee  Preparation: Patient was prepped and draped in the usual sterile fashion  Needle size: 20 G  Ultrasound guidance: no  Approach: anterolateral  Medications administered: 20 mg Sodium Hyaluronate 20 MG/2ML    Patient tolerance: patient tolerated the procedure well with no immediate complications  Dressing:  Sterile dressing applied          Subjective: Right knee Euflexxa #1    Patient ID: Lindsay Hogan is a 74 y o  female      Tri Courser is a pleasant 79-year-old female presenting the 1st of 3 Euflexxa injections for her right knee      Review of Systems    Past Medical History:   Diagnosis Date    Knee internal derangement, right      Past Surgical History:   Procedure Laterality Date    COLONOSCOPY      KNEE SURGERY Left     Meniscus Repair    [place] KNEE SCOPE,MED/LAT MENISECTOMY Right 5/2/2022    Procedure: ARTHROSCOPY KNEE MEDIAL MENISCECTOMY; Surgeon: Cristofer Son MD;  Location: [facility];  Service: Orthopedics       Family History   Problem Relation Age of Onset    No Known Problems Mother     No Known Problems Father     No Known Problems Sister     No Known Problems Brother     No Known Problems Maternal Aunt     No Known Problems Maternal Uncle     No Known Problems Paternal Aunt     No Known Problems Paternal Uncle     No Known Problems Maternal Grandmother     No Known Problems Maternal Grandfather     No Known Problems Paternal Grandmother     No Known Problems Paternal Grandfather        Social History     Occupational History    Not on file   Tobacco Use    Smoking status: Former Smoker    Smokeless tobacco: Never Used   Vaping Use    Vaping Use: Never used   Substance and Sexual Activity    Alcohol use: Yes     Comment: Rare    Drug use: Never    Sexual activity: Not on file         Current Outpatient Medications:     Ascorbic Acid (vitamin C) 100 MG tablet, Take 100 mg by mouth daily, Disp: , Rfl:     cholecalciferol (VITAMIN D3) 1,000 units tablet, Take 1,000 Units by mouth daily, Disp: , Rfl:     Multiple Vitamin (multivitamin) tablet, Take 1 tablet by mouth daily, Disp: , Rfl:     NON FORMULARY, Cardio triplex 2 daily- last dose 4/25/22  Ortho- thyroid tab daily, Disp: , Rfl:     omega-3-acid ethyl esters (LOVAZA) 1 g capsule, Take 2 g by mouth 2 (two) times a day, Disp: , Rfl:     zinc gluconate 50 mg tablet, Take 50 mg by mouth daily, Disp: , Rfl:     No Known Allergies    Objective: There were no vitals filed for this visit  There is no height or weight on file to calculate BMI      Ortho Exam    Physical Exam

## 2022-07-07 ENCOUNTER — TELEPHONE (OUTPATIENT)
Dept: OBGYN CLINIC | Facility: CLINIC | Age: 67
End: 2022-07-07

## 2022-07-07 NOTE — TELEPHONE ENCOUNTER
Please be aware  Therese please inner office this medication tomorrow         *LUPILLO JASSO  we will need to inner office this back down to [place] after they see Hilda Solis

## 2022-07-12 ENCOUNTER — PROCEDURE VISIT (OUTPATIENT)
Dept: OBGYN CLINIC | Facility: CLINIC | Age: 67
End: 2022-07-12
Payer: MEDICARE

## 2022-07-12 VITALS — TEMPERATURE: 98.2 F

## 2022-07-12 DIAGNOSIS — M17.11 PRIMARY OSTEOARTHRITIS OF RIGHT KNEE: ICD-10-CM

## 2022-07-12 DIAGNOSIS — M25.561 CHRONIC PAIN OF RIGHT KNEE: Primary | ICD-10-CM

## 2022-07-12 DIAGNOSIS — G89.29 CHRONIC PAIN OF RIGHT KNEE: Primary | ICD-10-CM

## 2022-07-12 PROCEDURE — 20610 DRAIN/INJ JOINT/BURSA W/O US: CPT | Performed by: PHYSICIAN ASSISTANT

## 2022-07-12 RX ORDER — HYALURONATE SODIUM 10 MG/ML
20 SYRINGE (ML) INTRAARTICULAR
Status: COMPLETED | OUTPATIENT
Start: 2022-07-12 | End: 2022-07-12

## 2022-07-12 RX ADMIN — Medication 20 MG: at 11:48

## 2022-07-12 NOTE — PATIENT INSTRUCTIONS
Hyaluronic Acid (By injection)   Hyaluronic Acid ([id number]-tk-[id number]-ON-ate AS-id)  Treats severe pain in your knee due to osteoarthritis  Brand Name(s): Durolane, Euflexxa, Gel-One, GelSyn-3, GenVisc 850, Hyalgan, Hymovis, Monovisc, Orthovisc, Supartz FX, TriLURON, TriVisc, Visco-3   There may be other brand names for this medicine  When This Medicine Should Not Be Used: This medicine is not right for everyone  You should not receive it if you had an allergic reaction to hyaluronic acid or if you have a bleeding disorder  How to Use This Medicine:   Injectable  Your doctor will tell you how many injections you will need  This medicine is injected into your knee joint  A nurse or other health provider will give you this medicine  Drugs and Foods to Avoid:      Ask your doctor or pharmacist before using any other medicine, including over-the-counter medicines, vitamins, and herbal products  Warnings While Using This Medicine:   Tell your doctor if you are pregnant or breastfeeding, or if you have any allergies, including to birds, feathers, or eggs  Rest your knee for 48 hours after you receive an injection  Do not do strenuous, weightbearing activities, such as jogging or tennis  Avoid activities that keep you standing for longer than 1 hour  Possible Side Effects While Using This Medicine:   Call your doctor right away if you notice any of these side effects: Allergic reaction: Itching or hives, swelling in your face or hands, swelling or tingling in your mouth or throat, chest tightness, trouble breathing  If you notice these less serious side effects, talk with your doctor:   Mild increase in pain or swelling in your knee  Pain, redness, or swelling where the medicine is injected  If you notice other side effects that you think are caused by this medicine, tell your doctor  Call your doctor for medical advice about side effects   You may report side effects to FDA at 3-170-FDA-5166    © Copyright [employer] 2022 Information is for [employer] use only and may not be sold, redistributed or otherwise used for commercial purposes  The above information is an  only  It is not intended as medical advice for individual conditions or treatments  Talk to your doctor, nurse or pharmacist before following any medical regimen to see if it is safe and effective for you

## 2022-07-12 NOTE — PROGRESS NOTES
Orthopaedic Surgery - Office Note  Jessica Garcia (55 y o  female)   : 1955   MRN: [id number]  Encounter Date: 2022    Chief Complaint   Patient presents with    Right Knee - Pain         Assessment/Plan  Diagnoses and all orders for this visit:    Chronic pain of right knee    Primary osteoarthritis of right knee    Other orders  -     Large joint arthrocentesis: R knee    Suzanne Little is here today for 2  Euflexxa in the right knee  She will follow up in 1 week as scheduled for # 3 in the series  She will ice the knee 20 minutes on 1 hour off 3 times a day  Return As scheduled for Euflexxa 3  In the right knee  History of Present Illness  Teagan is here for Euflexxa 2  In the right knee  Patient denies any complications from the 1st injection  Review of Systems  Pertinent items are noted in HPI  All other systems were reviewed and are negative  Physical Exam  Temp 98 2 °F (36 8 °C)   Cons: Appears well  No apparent distress  Psych: Alert  Oriented x3  Mood and affect normal     Right knee is without signs of infection at the injection site  Studies Reviewed  Previous office note was reviewed by myself    Large joint arthrocentesis: R knee  Universal Protocol:  Consent: Verbal consent obtained  Risks and benefits: risks, benefits and alternatives were discussed  Consent given by: patient  Time out: Immediately prior to procedure a "time out" was called to verify the correct patient, procedure, equipment, support staff and site/side marked as required  Timeout called at: 2022 11:47 AM   Patient understanding: patient states understanding of the procedure being performed  Patient consent: the patient's understanding of the procedure matches consent given  Relevant documents: relevant documents present and verified  Test results: test results available and properly labeled  Site marked: the operative site was marked  Radiology Images displayed and confirmed   If images not available, report reviewed: imaging studies available  Patient identity confirmed: verbally with patient    Supporting Documentation  Indications: pain   Procedure Details  Location: knee - R knee  Preparation: Patient was prepped and draped in the usual sterile fashion  Needle size: 22 G  Ultrasound guidance: no  Approach: anterolateral  Medications administered: 20 mg Sodium Hyaluronate 20 MG/2ML    Patient tolerance: patient tolerated the procedure well with no immediate complications  Dressing:  Sterile dressing applied    #2        Medical, Surgical, Family, and Social History  The patient's medical history, family history, and social history, were reviewed and updated as appropriate      Past Medical History:   Diagnosis Date    Knee internal derangement, right        Past Surgical History:   Procedure Laterality Date    COLONOSCOPY      KNEE SURGERY Left     Meniscus Repair    [place] KNEE SCOPE,MED/LAT MENISECTOMY Right 5/2/2022    Procedure: ARTHROSCOPY KNEE MEDIAL MENISCECTOMY;  Surgeon: Nurys Krueger MD;  [facility];  Service: Orthopedics       Family History   Problem Relation Age of Onset    No Known Problems Mother     No Known Problems Father     No Known Problems Sister     No Known Problems Brother     No Known Problems Maternal Aunt     No Known Problems Maternal Uncle     No Known Problems Paternal Aunt     No Known Problems Paternal Uncle     No Known Problems Maternal Grandmother     No Known Problems Maternal Grandfather     No Known Problems Paternal Grandmother     No Known Problems Paternal Grandfather        Social History     Occupational History    Not on file   Tobacco Use    Smoking status: Former Smoker    Smokeless tobacco: Never Used   Vaping Use    Vaping Use: Never used   Substance and Sexual Activity    Alcohol use: Yes     Comment: Rare    Drug use: Never    Sexual activity: Not on file       No Known Allergies      Current Outpatient Medications:     Ascorbic Acid (vitamin C) 100 MG tablet, Take 100 mg by mouth daily, Disp: , Rfl:     cholecalciferol (VITAMIN D3) 1,000 units tablet, Take 1,000 Units by mouth daily, Disp: , Rfl:     Multiple Vitamin (multivitamin) tablet, Take 1 tablet by mouth daily, Disp: , Rfl:     NON FORMULARY, Cardio triplex 2 daily- last dose 4/25/22  Ortho- thyroid tab daily, Disp: , Rfl:     omega-3-acid ethyl esters (LOVAZA) 1 g capsule, Take 2 g by mouth 2 (two) times a day, Disp: , Rfl:     zinc gluconate 50 mg tablet, Take 50 mg by mouth daily, Disp: , Rfl:       [employer], PA-C

## 2022-07-19 ENCOUNTER — PROCEDURE VISIT (OUTPATIENT)
Dept: OBGYN CLINIC | Facility: CLINIC | Age: 67
End: 2022-07-19
Payer: MEDICARE

## 2022-07-19 DIAGNOSIS — M17.11 PRIMARY OSTEOARTHRITIS OF RIGHT KNEE: Primary | ICD-10-CM

## 2022-07-19 PROCEDURE — 20610 DRAIN/INJ JOINT/BURSA W/O US: CPT | Performed by: PHYSICIAN ASSISTANT

## 2022-07-19 RX ORDER — HYALURONATE SODIUM 10 MG/ML
20 SYRINGE (ML) INTRAARTICULAR
Status: COMPLETED | OUTPATIENT
Start: 2022-07-19 | End: 2022-07-19

## 2022-07-19 RX ADMIN — Medication 20 MG: at 08:51

## 2022-07-19 NOTE — PROGRESS NOTES
Assessment/Plan:  1  Primary osteoarthritis of right knee       Follow-up prn  Subjective:   Mitch Phalen is a 74 y o  female who presents today for euflexxa #3 right knee        Review of Systems      Past Medical History:   Diagnosis Date    Knee internal derangement, right        Past Surgical History:   Procedure Laterality Date    COLONOSCOPY      KNEE SURGERY Left     Meniscus Repair    [place] KNEE SCOPE,MED/LAT MENISECTOMY Right 5/2/2022    Procedure: ARTHROSCOPY KNEE MEDIAL MENISCECTOMY;  Surgeon: Mortimer Fix, MD;  Location: [facility];  Service: Orthopedics       Family History   Problem Relation Age of Onset    No Known Problems Mother     No Known Problems Father     No Known Problems Sister     No Known Problems Brother     No Known Problems Maternal Aunt     No Known Problems Maternal Uncle     No Known Problems Paternal Aunt     No Known Problems Paternal Uncle     No Known Problems Maternal Grandmother     No Known Problems Maternal Grandfather     No Known Problems Paternal Grandmother     No Known Problems Paternal Grandfather        Social History     Occupational History    Not on file   Tobacco Use    Smoking status: Former Smoker    Smokeless tobacco: Never Used   Vaping Use    Vaping Use: Never used   Substance and Sexual Activity    Alcohol use: Yes     Comment: Rare    Drug use: Never    Sexual activity: Not on file         Current Outpatient Medications:     Ascorbic Acid (vitamin C) 100 MG tablet, Take 100 mg by mouth daily, Disp: , Rfl:     cholecalciferol (VITAMIN D3) 1,000 units tablet, Take 1,000 Units by mouth daily, Disp: , Rfl:     Multiple Vitamin (multivitamin) tablet, Take 1 tablet by mouth daily, Disp: , Rfl:     NON FORMULARY, Cardio triplex 2 daily- last dose 4/25/22  Ortho- thyroid tab daily, Disp: , Rfl:     omega-3-acid ethyl esters (LOVAZA) 1 g capsule, Take 2 g by mouth 2 (two) times a day, Disp: , Rfl:     zinc gluconate 50 mg tablet, Take 50 mg by mouth daily, Disp: , Rfl:     No Known Allergies    Objective: There were no vitals filed for this visit      Ortho Exam    Physical Exam    Large joint arthrocentesis: R knee  Universal Protocol:  Risks and benefits: risks, benefits and alternatives were discussed  Consent given by: patient  Timeout called at: 7/19/2022 8:50 AM   Site marked: the operative site was marked  Supporting Documentation  Indications: pain   Procedure Details  Location: knee - R knee  Preparation: Patient was prepped and draped in the usual sterile fashion (Alcohol prep)  Needle size: 22 G  Ultrasound guidance: no  Approach: anterolateral  Medications administered: 20 mg Sodium Hyaluronate 20 MG/2ML    Patient tolerance: patient tolerated the procedure well with no immediate complications  Dressing:  Sterile dressing applied

## (undated) DEVICE — TIBURON SPLIT SHEET: Brand: CONVERTORS

## (undated) DEVICE — INTENDED FOR TISSUE SEPARATION, AND OTHER PROCEDURES THAT REQUIRE A SHARP SURGICAL BLADE TO PUNCTURE OR CUT.: Brand: BARD-PARKER SAFETY BLADES SIZE 11, STERILE

## (undated) DEVICE — ACE WRAP 6 IN XL STERILE

## (undated) DEVICE — PROBE ABLATION  APOLLO RF 50 DEG MULTI PORT

## (undated) DEVICE — OCCLUSIVE GAUZE STRIP,3% BISMUTH TRIBROMOPHENATE IN PETROLATUM BLEND: Brand: XEROFORM

## (undated) DEVICE — GLOVE INDICATOR PI UNDERGLOVE SZ 6.5 BLUE

## (undated) DEVICE — TIBURON EXTREMITY SHEET: Brand: CONVERTORS

## (undated) DEVICE — CHLORAPREP HI-LITE 26ML ORANGE

## (undated) DEVICE — PACK ARTHROSCOPY

## (undated) DEVICE — BLADE SHAVER TORPEDO CRV 4MM 13MM COOLCUT

## (undated) DEVICE — GLOVE SRG BIOGEL 7.5

## (undated) DEVICE — TUBING ARTHROSCOPIC WAVE  MAIN PUMP

## (undated) DEVICE — ARTHROSCOPY FLOOR MAT

## (undated) DEVICE — GLOVE INDICATOR PI UNDERGLOVE SZ 7.5 BLUE

## (undated) DEVICE — SPONGE GAUZE 4 X 8 12 PLY STRL LF

## (undated) DEVICE — GLOVE SRG BIOGEL 6.5

## (undated) DEVICE — FABRIC REINFORCED, SURGICAL GOWN, XL: Brand: CONVERTORS